# Patient Record
Sex: MALE | URBAN - METROPOLITAN AREA
[De-identification: names, ages, dates, MRNs, and addresses within clinical notes are randomized per-mention and may not be internally consistent; named-entity substitution may affect disease eponyms.]

---

## 2022-04-19 ENCOUNTER — APPOINTMENT (OUTPATIENT)
Dept: URBAN - METROPOLITAN AREA CLINIC 259 | Age: 37
Setting detail: DERMATOLOGY
End: 2022-04-26

## 2022-04-19 DIAGNOSIS — L81.4 OTHER MELANIN HYPERPIGMENTATION: ICD-10-CM

## 2022-04-19 DIAGNOSIS — D22 MELANOCYTIC NEVI: ICD-10-CM

## 2022-04-19 DIAGNOSIS — L57.0 ACTINIC KERATOSIS: ICD-10-CM

## 2022-04-19 DIAGNOSIS — D18.0 HEMANGIOMA: ICD-10-CM

## 2022-04-19 DIAGNOSIS — L57.8 OTHER SKIN CHANGES DUE TO CHRONIC EXPOSURE TO NONIONIZING RADIATION: ICD-10-CM

## 2022-04-19 PROBLEM — D22.5 MELANOCYTIC NEVI OF TRUNK: Status: ACTIVE | Noted: 2022-04-19

## 2022-04-19 PROBLEM — D48.5 NEOPLASM OF UNCERTAIN BEHAVIOR OF SKIN: Status: ACTIVE | Noted: 2022-04-19

## 2022-04-19 PROBLEM — D18.01 HEMANGIOMA OF SKIN AND SUBCUTANEOUS TISSUE: Status: ACTIVE | Noted: 2022-04-19

## 2022-04-19 PROCEDURE — 99203 OFFICE O/P NEW LOW 30 MIN: CPT | Mod: 25

## 2022-04-19 PROCEDURE — 17003 DESTRUCT PREMALG LES 2-14: CPT

## 2022-04-19 PROCEDURE — OTHER LIQUID NITROGEN: OTHER

## 2022-04-19 PROCEDURE — OTHER SHAVE REMOVAL: OTHER

## 2022-04-19 PROCEDURE — OTHER MIPS QUALITY: OTHER

## 2022-04-19 PROCEDURE — 17000 DESTRUCT PREMALG LESION: CPT | Mod: 59

## 2022-04-19 PROCEDURE — 11301 SHAVE SKIN LESION 0.6-1.0 CM: CPT

## 2022-04-19 PROCEDURE — OTHER COUNSELING: OTHER

## 2022-04-19 ASSESSMENT — LOCATION ZONE DERM
LOCATION ZONE: TRUNK
LOCATION ZONE: NECK
LOCATION ZONE: ARM

## 2022-04-19 ASSESSMENT — LOCATION DETAILED DESCRIPTION DERM
LOCATION DETAILED: SUPRAPUBIC SKIN
LOCATION DETAILED: LEFT POSTERIOR SHOULDER
LOCATION DETAILED: RIGHT POSTERIOR SHOULDER
LOCATION DETAILED: RIGHT LATERAL TRAPEZIAL NECK
LOCATION DETAILED: LEFT SUPERIOR MEDIAL UPPER BACK

## 2022-04-19 ASSESSMENT — LOCATION SIMPLE DESCRIPTION DERM
LOCATION SIMPLE: LEFT SHOULDER
LOCATION SIMPLE: RIGHT SHOULDER
LOCATION SIMPLE: GROIN
LOCATION SIMPLE: POSTERIOR NECK
LOCATION SIMPLE: LEFT UPPER BACK

## 2022-04-19 NOTE — PROCEDURE: SHAVE REMOVAL
Biopsy Method: Dermablade
Body Location Override (Optional - Billing Will Still Be Based On Selected Body Map Location If Applicable): Left suprapubic
Post-Care Instructions: I reviewed with the patient in detail post-care instructions. Patient is to keep the biopsy site dry overnight, and then apply bacitracin twice daily until healed. Patient may apply hydrogen peroxide soaks to remove any crusting.
Notification Instructions: Patient will be notified of pathology results. However, patient instructed to call the office if not contacted within 2 weeks.
Size Of Lesion In Cm (Required): 0.6
X Size Of Lesion In Cm (Optional): 0
Hemostasis: Drysol
Bill 85388 For Specimen Handling/Conveyance To Laboratory?: no
Medical Necessity Clause: This procedure was medically necessary because the lesion that was treated was:
Wound Care: Petrolatum
Anesthesia Type: 1% lidocaine with epinephrine
Detail Level: Detailed
Medical Necessity Information: It is in your best interest to select a reason for this procedure from the list below. All of these items fulfill various CMS LCD requirements except the new and changing color options.
Consent was obtained from the patient. The risks and benefits to therapy were discussed in detail. Specifically, the risks of infection, scarring, bleeding, prolonged wound healing, incomplete removal, allergy to anesthesia, nerve injury and recurrence were addressed. Prior to the procedure, the treatment site was clearly identified and confirmed by the patient. All components of Universal Protocol/PAUSE Rule completed.
Billing Type: Third-Party Bill
Was A Bandage Applied: Yes

## 2022-04-19 NOTE — PROCEDURE: LIQUID NITROGEN
Post-Care Instructions: I reviewed with the patient in detail post-care instructions. Patient is to wear sunprotection, and avoid picking at any of the treated lesions. Pt may apply Vaseline to crusted or scabbing areas.
Render Note In Bullet Format When Appropriate: No
Detail Level: Simple
Show Aperture Variable?: Yes
Duration Of Freeze Thaw-Cycle (Seconds): 5
Consent: The patient's consent was obtained including but not limited to risks of crusting, scabbing, blistering, scarring, darker or lighter pigmentary change, recurrence, incomplete removal and infection.
Number Of Freeze-Thaw Cycles: 1 freeze-thaw cycle

## 2023-10-04 ENCOUNTER — OFFICE VISIT (OUTPATIENT)
Dept: PODIATRY | Facility: CLINIC | Age: 38
End: 2023-10-04
Payer: COMMERCIAL

## 2023-10-04 VITALS — HEART RATE: 75 BPM | DIASTOLIC BLOOD PRESSURE: 70 MMHG | SYSTOLIC BLOOD PRESSURE: 125 MMHG | HEIGHT: 72 IN

## 2023-10-04 DIAGNOSIS — M21.6X1 PRONATION OF BOTH FEET: Primary | ICD-10-CM

## 2023-10-04 DIAGNOSIS — M21.6X2 PRONATION OF BOTH FEET: Primary | ICD-10-CM

## 2023-10-04 DIAGNOSIS — M20.5X9 HALLUX LIMITUS, UNSPECIFIED LATERALITY: ICD-10-CM

## 2023-10-04 PROBLEM — R91.1 SOLITARY PULMONARY NODULE: Status: ACTIVE | Noted: 2017-03-31

## 2023-10-04 PROCEDURE — 99203 OFFICE O/P NEW LOW 30 MIN: CPT | Performed by: PODIATRIST

## 2023-10-04 NOTE — PATIENT INSTRUCTIONS
We wish you continued good healing. If you have any questions or concerns, please do not hesitate to contact us at  [phone]    [facility] [employer] (secure e-mail communication and access to your chart) to send a message or to make an appointment.    Please remember to call and schedule a follow up appointment if one was recommended at your earliest convenience.     PODIATRY CLINIC HOURS  TELEPHONE NUMBER    Dr. Hernan CORNEJOPAGUSTIN FACFAS        Clinics:  [facility]   [place]  Tuesday 1PM-6PM  [place]  Wednesday 745AM-330PM  [place]  Monday 2nd,4th  830AM-4PM  Thursday/Friday 745AM-230PM     [place] APPOINTMENTS  [phone]    [place] APPOINTMENTS  [phone]          If you need a medication refill, please contact us you may need lab work and/or a follow up visit prior to your refill (i.e. Antifungal medications).  If MRI needed please call Imaging at [phone]   HOW DO I GET MY KNEE SCOOTER? Knee scooters can be picked up at ANY Medical Supply stores with your knee scooter Prescription.  OR  Bring your signed prescription to an [facility] showroom.   Set up an appointment for your custom Orthotics. Call any Orthotics locations call [phone]

## 2023-10-04 NOTE — LETTER
"    10/4/2023         RE: Juan Baldwin  [address] [place] [address]        Dear Colleague,    Thank you for referring your patient, Juan Baldwin, to the [facility]. Please see a copy of my visit note below.     Subjective:    Pt is seen today as a new pt referral with the c/c of bilateral foot pain.  Started this spring.  Aggravated by activity and relieved by rest.  Hurts patient on a daily basis.  Denies trauma.  Denies erythema ecchymosis numbness.  He is very athletic.  He works as an .    ROS:  see above         Allergies   Allergen Reactions     Sulfamethoxazole-Trimethoprim Hives       No current outpatient medications on file.       Patient Active Problem List   Diagnosis     Solitary pulmonary nodule     Astigmatism       No past medical history on file.    No past surgical history on file.    No family history on file.    Social History     Tobacco Use     Smoking status: Never     Smokeless tobacco: Never   Substance Use Topics     Alcohol use: Not on file         Exam:    Vitals: /70   Pulse 75   Ht 1.829 m (6')   BMI: There is no height or weight on file to calculate BMI.  Height: 6' 0\"    Constitutional/ general:  Pt is in no apparent distress, appears well-nourished.  Cooperative with history and physical exam.     Psych:  The patient answered questions appropriately.  Normal affect.  Seems to have reasonable expectations, in terms of treatment.     Lungs:  Non labored breathing, non labored speech. No cough.  No audible wheezing. Even, quiet breathing.       Vascular:  positive pedal pulses bilaterally for both the DP and PT arteries.  CFT < 3 sec.  negative ankle edema.  positive pedal hair growth.    Neuro:  Alert and oriented x 3. Coordinated gait.  Light touch sensation is intact     Derm: Normal texture and turgor.  No erythema, ecchymosis, or cyanosis.      Musculoskeletal:    Lower extremity muscle strength is normal.  Patient is ambulatory " without an assistive device or brace.   Pronated arch with weightbearing bilaterally.  Normal ROM all forefoot and rearfoot joints except for bilateral first MTPJ's which have limited range of motion.  Patient has pain with range of motion.  Óscar proliferation dorsally.  The right first metatarsal head is especially prominent.  No echymosis, edema, signs of infection or trauma. No open lesions, increased warmth or ascending cellulitis.      X-ray three views foot shows joint space narrowing of 1st mtpj, subcondral schlerosis, and a dorsal flag with joint mice.  Patient has elevated first metatarsal bilaterally with the right being significant    Assessment:    Bilateral pronation  Elevated first metatarsal right greater than left  Bilateral hallux limitus    Plan:  X-rays taken today.  Discussed mechanics contributing to arthritis in both first MTPJ's.  Patient has tried wearing stiff shoes and avoiding activities that bother this.  Discussed surgically he would have to have the first metatarsal elevatus addressed as well as the arthritic joints in the context of a pronated foot.  Will refer to [facility] for further consultation on this    Hernan De Oliveira DPM, FACFAS         Again, thank you for allowing me to participate in the care of your patient.        Sincerely,        Hernan De Oliveira DPM

## 2023-10-06 NOTE — PROGRESS NOTES
" Subjective:    Pt is seen today as a new pt referral with the c/c of bilateral foot pain.  Started this spring.  Aggravated by activity and relieved by rest.  Hurts patient on a daily basis.  Denies trauma.  Denies erythema ecchymosis numbness.  He is very athletic.  He works as an .    ROS:  see above         Allergies   Allergen Reactions    Sulfamethoxazole-Trimethoprim Hives       No current outpatient medications on file.       Patient Active Problem List   Diagnosis    Solitary pulmonary nodule    Astigmatism       No past medical history on file.    No past surgical history on file.    No family history on file.    Social History     Tobacco Use    Smoking status: Never    Smokeless tobacco: Never   Substance Use Topics    Alcohol use: Not on file         Exam:    Vitals: /70   Pulse 75   Ht 1.829 m (6')   BMI: There is no height or weight on file to calculate BMI.  Height: 6' 0\"    Constitutional/ general:  Pt is in no apparent distress, appears well-nourished.  Cooperative with history and physical exam.     Psych:  The patient answered questions appropriately.  Normal affect.  Seems to have reasonable expectations, in terms of treatment.     Lungs:  Non labored breathing, non labored speech. No cough.  No audible wheezing. Even, quiet breathing.       Vascular:  positive pedal pulses bilaterally for both the DP and PT arteries.  CFT < 3 sec.  negative ankle edema.  positive pedal hair growth.    Neuro:  Alert and oriented x 3. Coordinated gait.  Light touch sensation is intact     Derm: Normal texture and turgor.  No erythema, ecchymosis, or cyanosis.      Musculoskeletal:    Lower extremity muscle strength is normal.  Patient is ambulatory without an assistive device or brace.   Pronated arch with weightbearing bilaterally.  Normal ROM all forefoot and rearfoot joints except for bilateral first MTPJ's which have limited range of motion.  Patient has pain with range of motion.  Óscar " proliferation dorsally.  The right first metatarsal head is especially prominent.  No echymosis, edema, signs of infection or trauma. No open lesions, increased warmth or ascending cellulitis.      X-ray three views foot shows joint space narrowing of 1st mtpj, subcondral schlerosis, and a dorsal flag with joint mice.  Patient has elevated first metatarsal bilaterally with the right being significant    Assessment:    Bilateral pronation  Elevated first metatarsal right greater than left  Bilateral hallux limitus    Plan:  X-rays taken today.  Discussed mechanics contributing to arthritis in both first MTPJ's.  Patient has tried wearing stiff shoes and avoiding activities that bother this.  Discussed surgically he would have to have the first metatarsal elevatus addressed as well as the arthritic joints in the context of a pronated foot.  Will refer to [facility] for further consultation on this    Hernan De Oliveira DPM, FACFAS

## 2023-10-20 NOTE — TELEPHONE ENCOUNTER
Action November 9, 2023 3:56 PM MT   Action Taken Called patient for more information, patient confirms that his imagign was done at [facility], but the imaging was poorly labeled on the disk which may be the reason why [place] did not upload the imaging? Asked the patient to bring in the reports and image disk from  podiatry to appt tomorrow.       DIAGNOSIS: Bilateral Foot Arthritis   APPOINTMENT DATE: 11/10/23   NOTES STATUS DETAILS   OFFICE NOTE from referring provider Internal 10/4/23 - Hernan De Oliveira DPM - [facility]   MEDICATION LIST Internal

## 2023-11-09 DIAGNOSIS — M79.672 BILATERAL FOOT PAIN: Primary | ICD-10-CM

## 2023-11-09 DIAGNOSIS — M79.671 BILATERAL FOOT PAIN: Primary | ICD-10-CM

## 2023-11-10 ENCOUNTER — PRE VISIT (OUTPATIENT)
Dept: ORTHOPEDICS | Facility: CLINIC | Age: 38
End: 2023-11-10

## 2023-11-10 ENCOUNTER — OFFICE VISIT (OUTPATIENT)
Dept: ORTHOPEDICS | Facility: CLINIC | Age: 38
End: 2023-11-10
Attending: PODIATRIST
Payer: COMMERCIAL

## 2023-11-10 ENCOUNTER — ANCILLARY PROCEDURE (OUTPATIENT)
Dept: GENERAL RADIOLOGY | Facility: CLINIC | Age: 38
End: 2023-11-10
Attending: ORTHOPAEDIC SURGERY
Payer: COMMERCIAL

## 2023-11-10 VITALS — HEIGHT: 71 IN | BODY MASS INDEX: 27.58 KG/M2 | WEIGHT: 197 LBS

## 2023-11-10 DIAGNOSIS — M20.22 HALLUX RIGIDUS, LEFT FOOT: ICD-10-CM

## 2023-11-10 DIAGNOSIS — M79.672 BILATERAL FOOT PAIN: Primary | ICD-10-CM

## 2023-11-10 DIAGNOSIS — M79.671 BILATERAL FOOT PAIN: ICD-10-CM

## 2023-11-10 DIAGNOSIS — M79.672 BILATERAL FOOT PAIN: ICD-10-CM

## 2023-11-10 DIAGNOSIS — M79.671 BILATERAL FOOT PAIN: Primary | ICD-10-CM

## 2023-11-10 PROCEDURE — 73630 X-RAY EXAM OF FOOT: CPT | Mod: RT | Performed by: RADIOLOGY

## 2023-11-10 PROCEDURE — 99204 OFFICE O/P NEW MOD 45 MIN: CPT | Mod: GC | Performed by: ORTHOPAEDIC SURGERY

## 2023-11-10 NOTE — PROGRESS NOTES
"Orthopaedic Surgery Clinic Note - New Patient      Chief Complaint:  Bilateral hallux rigidus.    History:  Juan is a 38-year-old male who presents as a new consult for bilateral foot pain.  The patient was last seen by Dr. De Oliveira on 10/4/2023 for the same above complaint.  The patient states that his pain began over this past summer and denies any injury or trauma to his feet.  He states that he was quite active playing baseball and golf and feels that his bilateral foot pain was related to his feet rubbing against his cleats.  After he stopped playing baseball he continued to endorse pain and therefore sought evaluation.  He denies any prior injury or surgery to his bilateral feet.  He endorses pain over the first MTP joint bilaterally.  This pain is worse with walking and running.  He rates the pain as an 8/10 with running and 1/10 while at rest.  He states that the left-sided pain is worse in the right.  He has taken ibuprofen for pain control.     Past Medical History:  No past medical history on file.    Allergies:  Allergies   Allergen Reactions    Sulfamethoxazole-Trimethoprim Hives       Social History:  Social History     Occupational History    Not on file   Tobacco Use    Smoking status: Never    Smokeless tobacco: Never   Substance and Sexual Activity    Alcohol use: Not on file    Drug use: Not on file    Sexual activity: Not on file       Medications:  No current outpatient medications on file.    Exam:  Vitals: Ht 1.803 m (5' 11\")   Wt 89.4 kg (197 lb)   BMI 27.48 kg/m    Estimated body mass index is 27.48 kg/m  as calculated from the following:    Height as of this encounter: 1.803 m (5' 11\").    Weight as of this encounter: 89.4 kg (197 lb).  No acute distress  Nonlabored breathing  Focused exam of the bilateral lower extremities demonstrates skin intact.  Mild tenderness to palpation over the first MTP joint bilaterally.  Pain with first MTP flexion/extension as well as positive grind test.  " Hindfoot neutral.  5/5 strength of the tibialis anterior/gastrocsoleus/EHL/FHL.  Sensation intact light touch distally.  2+ dorsalis pedis pulse.     Imaging:  Independent review of imaging studies was performed.  These include bilateral foot AP/lateral/oblique radiographs.  These images demonstrate no acute fracture or dislocation.  Bilateral first MTPJ joint space narrowing and osteophyte formation.    Impression/Plan:  Juan is a 38-year-old male with bilateral hallux rigidus.  We discussed treatment options including both nonsurgical and surgical.  For nonsurgical management we discussed Marshall's extension orthotic, rocker-bottom shoes, topical and oral anti-inflammatories and corticosteroid injections.  For surgical management we discussed first MTPJ arthroplasty, cheilectomy, first MTPJ fusion, cotton osteotomy as well as Lapidus procedure.  We discussed the risks and benefits of each of these procedures.  We discussed at this point time we would recommend a cheilectomy procedure as this has the best chance to provide pain relief but also maintain motion.  We discussed eventually the patient may require a fusion however he is quite young.  We discussed that there are other surgeons were doing MTPJ arthroplasty and if he was interested in pursuing this would be happy to refer him to one of the surgeons.  After discussion the patient would like to proceed with cheilectomy.  We will plan to start with the left side and the right side shortly afterwards.  All questions answered.     Rodney Ortega MD  Orthopedic Surgery PGY4

## 2023-11-10 NOTE — PROGRESS NOTES
Teaching Flowsheet   Relevant Diagnosis: Bilateral Big Toes  Teaching Topic: Surgery     Person(s) involved in teaching:   Patient     Motivation Level:  Asks Questions: Yes  Eager to Learn: Yes  Cooperative: Yes  Receptive (willing/able to accept information): Yes  Any cultural factors/Spiritism beliefs that may influence understanding or compliance? No       Patient demonstrates understanding of the following:  Reason for the appointment, diagnosis and treatment plan: Yes  Knowledge of proper use of medications and conditions for which they are ordered (with special attention to potential side effects or drug interactions): Yes  Which situations necessitate calling provider and whom to contact: Yes       Teaching Concerns Addressed:        Proper use and care of medication (medical equip, care aids, etc.): Yes  Nutritional needs and diet plan: Yes  Pain management techniques: Yes  Wound Care: Yes  How and/when to access community resources: Yes     Instructional Materials Used/Given: pre op packet, soap      Time spent with patient: 15 minutes.    Luisa Roa RN

## 2023-11-10 NOTE — LETTER
11/10/2023         RE: Juan Baldwin  [address] [facility]  [place] [address]        Dear Colleague,    Thank you for referring your patient, Juan Baldwin, to the [facility] [place]. Please see a copy of my visit note below.    Teaching Flowsheet   Relevant Diagnosis: Bilateral Big Toes  Teaching Topic: Surgery     Person(s) involved in teaching:   Patient     Motivation Level:  Asks Questions: Yes  Eager to Learn: Yes  Cooperative: Yes  Receptive (willing/able to accept information): Yes  Any cultural factors/[place] beliefs that may influence understanding or compliance? No       Patient demonstrates understanding of the following:  Reason for the appointment, diagnosis and treatment plan: Yes  Knowledge of proper use of medications and conditions for which they are ordered (with special attention to potential side effects or drug interactions): Yes  Which situations necessitate calling provider and whom to contact: Yes       Teaching Concerns Addressed:        Proper use and care of medication (medical equip, care aids, etc.): Yes  Nutritional needs and diet plan: Yes  Pain management techniques: Yes  Wound Care: Yes  How and/when to access community resources: Yes     Instructional Materials Used/Given: pre op packet, soap      Time spent with patient: 15 minutes.    Luisa Roa RN      Orthopaedic Surgery Clinic Note - New Patient      Chief Complaint:  Bilateral hallux rigidus.    History:  Juan is a 38-year-old male who presents as a new consult for bilateral foot pain.  The patient was last seen by Dr. De Oliveira on 10/4/2023 for the same above complaint.  The patient states that his pain began over this past summer and denies any injury or trauma to his feet.  He states that he was quite active playing baseball and golf and feels that his bilateral foot pain was related to his feet rubbing against his cleats.  After he stopped playing baseball he continued to endorse pain  "and therefore sought evaluation.  He denies any prior injury or surgery to his bilateral feet.  He endorses pain over the first MTP joint bilaterally.  This pain is worse with walking and running.  He rates the pain as an 8/10 with running and 1/10 while at rest.  He states that the left-sided pain is worse in the right.  He has taken ibuprofen for pain control.     Past Medical History:  No past medical history on file.    Allergies:  Allergies   Allergen Reactions    Sulfamethoxazole-Trimethoprim Hives       Social History:  Social History     Occupational History    Not on file   Tobacco Use    Smoking status: Never    Smokeless tobacco: Never   Substance and Sexual Activity    Alcohol use: Not on file    Drug use: Not on file    Sexual activity: Not on file       Medications:  No current outpatient medications on file.    Exam:  Vitals: Ht 1.803 m (5' 11\")   Wt 89.4 kg (197 lb)   BMI 27.48 kg/m    Estimated body mass index is 27.48 kg/m  as calculated from the following:    Height as of this encounter: 1.803 m (5' 11\").    Weight as of this encounter: 89.4 kg (197 lb).  No acute distress  Nonlabored breathing  Focused exam of the bilateral lower extremities demonstrates skin intact.  Mild tenderness to palpation over the first MTP joint bilaterally.  Pain with first MTP flexion/extension as well as positive grind test.  Hindfoot neutral.  5/5 strength of the tibialis anterior/gastrocsoleus/EHL/FHL.  Sensation intact light touch distally.  2+ dorsalis pedis pulse.     Imaging:  Independent review of imaging studies was performed.  These include bilateral foot AP/lateral/oblique radiographs.  These images demonstrate no acute fracture or dislocation.  Bilateral first MTPJ joint space narrowing and osteophyte formation.    Impression/Plan:  Juan is a 38-year-old male with bilateral hallux rigidus.  We discussed treatment options including both nonsurgical and surgical.  For nonsurgical management we discussed " Marshall's extension orthotic, rocker-bottom shoes, topical and oral anti-inflammatories and corticosteroid injections.  For surgical management we discussed first MTPJ arthroplasty, cheilectomy, first MTPJ fusion, cotton osteotomy as well as Lapidus procedure.  We discussed the risks and benefits of each of these procedures.  We discussed at this point time we would recommend a cheilectomy procedure as this has the best chance to provide pain relief but also maintain motion.  We discussed eventually the patient may require a fusion however he is quite young.  We discussed that there are other surgeons were doing MTPJ arthroplasty and if he was interested in pursuing this would be happy to refer him to one of the surgeons.  After discussion the patient would like to proceed with cheilectomy.  We will plan to start with the left side and the right side shortly afterwards.  All questions answered.     Rodney Ortega MD  Orthopedic Surgery PGY4        Attestation signed by Wicho Sears MD at 11/21/2023  1:00 PM:  Physician Attestation   I saw this patient with the resident and agree with the resident/fellow's findings and plan of care as documented in the note.    Key findings: 37yo male patient with bilateral hallux rigidus. Endorses that his left big toe is currently more painful than his right. I reviewed his imaging studies with him. Approximately 15 degree apex plantar Meary's angle with a sag at the NC joint.  Hallux MTP arthritis bilaterally with some joint space remaining. I also reviewed both the nonsurgical and surgical treatment options with Mr. Baldwin. [place] agreement on a cheilectomy. I discussed the arthritis and likely some pain will still be there after surgery, and he may need additional future surgery such as a fusion, for which there would be drawbacks to doing this now. Risks, benefits, and alternatives were discussed. All questions answered. He'd like to do the left side first.    Wicho Sears,  MD  Date of Service (when I saw the patient): 11/10/2023.

## 2023-11-21 ENCOUNTER — TELEPHONE (OUTPATIENT)
Dept: ORTHOPEDICS | Facility: CLINIC | Age: 38
End: 2023-11-21
Payer: COMMERCIAL

## 2023-11-21 DIAGNOSIS — M20.21 HALLUX RIGIDUS OF RIGHT FOOT: Primary | ICD-10-CM

## 2023-11-21 PROBLEM — M20.22 HALLUX RIGIDUS, LEFT FOOT: Status: ACTIVE | Noted: 2023-11-10

## 2023-11-21 NOTE — TELEPHONE ENCOUNTER
[facility] Call Center    Phone Message    May a detailed message be left on voicemail: yes     Reason for Call: Other: pt would like to schedule surgery, can care team call pt to schedule that appt?      Action Taken: Other: [facility] [[id number]]    Travel Screening: Not Applicable

## 2023-11-21 NOTE — TELEPHONE ENCOUNTER
Patient is scheduled for surgery with Dr. Sears    Spoke with: Juan    Date of Surgery: 1/15/24    Location: [facility]    Informed patient they will need an adult  Yes    Pre op with Provider PCP, patient will schedule    Additional imaging/appointments: POP Made    Surgery packet: Received    Additional comments: Patient would like a call back from care team to go over restrictions, post-op care, and regarding right side surgery. Will route to team. Provided direct call back number to patient for him to call back once he decides to proceed with scheduling his right side.          Faith Blanco on 11/21/2023 at 1:54 PM

## 2023-11-21 NOTE — TELEPHONE ENCOUNTER
RN called patient to discuss surgery plan and post op appointment dates. Patient is aware and would schedule his trip after the 2 week post op and prior to 6 week follow up.    Luisa Roa RN

## 2023-11-22 NOTE — TELEPHONE ENCOUNTER
Yes, if he wants to schedule the second one that soon, ok to schedule. Although I couldn't guarantee that he'd feel ready for his second surgery four weeks after the first, it's reasonable to schedule with the thought that it's ok from my standpoint and hopefully when the time comes it'll be ok from his standpoint.    PY

## 2023-11-28 ENCOUNTER — HOSPITAL ENCOUNTER (OUTPATIENT)
Facility: AMBULATORY SURGERY CENTER | Age: 38
Discharge: HOME | End: 2023-11-28
Attending: ORTHOPAEDIC SURGERY | Admitting: ORTHOPAEDIC SURGERY
Payer: COMMERCIAL

## 2023-11-28 DIAGNOSIS — M20.21 HALLUX RIGIDUS OF RIGHT FOOT: ICD-10-CM

## 2023-11-28 NOTE — TELEPHONE ENCOUNTER
Patient is scheduled for surgery with Dr. Sears     Spoke with: Juan    Date of Surgery: 2/19/24 (R)    Location: [facility]    Informed patient they will need an adult  Yes    Pre op with Provider PCP, patient will schedule    Additional imaging/appointments: POP Made    Surgery packet: Received previously     Additional comments: N/A        Faith Blanco on 11/28/2023 at 9:17 AM

## 2023-12-21 ENCOUNTER — OFFICE VISIT (OUTPATIENT)
Dept: FAMILY MEDICINE | Facility: CLINIC | Age: 38
End: 2023-12-21
Payer: COMMERCIAL

## 2023-12-21 VITALS
BODY MASS INDEX: 26.76 KG/M2 | WEIGHT: 197.6 LBS | SYSTOLIC BLOOD PRESSURE: 115 MMHG | HEIGHT: 72 IN | DIASTOLIC BLOOD PRESSURE: 71 MMHG | HEART RATE: 72 BPM | TEMPERATURE: 97.2 F | RESPIRATION RATE: 18 BRPM | OXYGEN SATURATION: 99 %

## 2023-12-21 DIAGNOSIS — Z11.59 NEED FOR HEPATITIS C SCREENING TEST: ICD-10-CM

## 2023-12-21 DIAGNOSIS — Z11.4 SCREENING FOR HIV (HUMAN IMMUNODEFICIENCY VIRUS): ICD-10-CM

## 2023-12-21 DIAGNOSIS — Z01.818 PREOP GENERAL PHYSICAL EXAM: Primary | ICD-10-CM

## 2023-12-21 PROCEDURE — 99204 OFFICE O/P NEW MOD 45 MIN: CPT | Mod: LT | Performed by: PHYSICIAN ASSISTANT

## 2023-12-21 NOTE — PROGRESS NOTES
[address] [address]  Phone: [phone]  Primary Provider: Jose Reinoso  Pre-op Performing Provider: JOSE REINOSO      PREOPERATIVE EVALUATION:  Today's date: 12/21/2023    Juan is a 38 year old, presenting for the following:  Pre-Op Exam        12/21/2023     1:03 PM   Additional Questions   Roomed by Calvin LEW       Surgical Information:  Surgery/Procedure: Left/Right Hallux MTP joint cheilectomy  Surgery Location: [facility]   Surgeon: Dr. Sears  Surgery Date: 1/15/2023 and 2/19/2023  Time of Surgery: unsure  Where patient plans to recover: At home with family  Fax number for surgical facility: Note does not need to be faxed, will be available electronically in Epic.    Assessment & Plan     The proposed surgical procedure is considered INTERMEDIATE risk.    Problem List Items Addressed This Visit    None  Visit Diagnoses       Screening for HIV (human immunodeficiency virus)    -  Primary    Need for hepatitis C screening test                        - No identified additional risk factors other than previously addressed    Antiplatelet or Anticoagulation Medication Instructions:   - Patient is on no antiplatelet or anticoagulation medications.    Additional Medication Instructions:  Patient is on no additional chronic medications    RECOMMENDATION:  APPROVAL GIVEN to proceed with proposed procedure, without further diagnostic evaluation.      20 minutes spent by me on the date of the encounter doing chart review, history and exam, documentation and further activities per the note      Subjective       HPI related to upcoming procedure:  Left/Right Hallux MTP joint cheilectomy due to MTP arthritis bilaterally           12/21/2023     1:06 PM   Preop Questions   1. Have you ever had a heart attack or stroke? No   2. Have you ever had surgery on your heart or blood vessels, such as a stent placement, a coronary  artery bypass, or surgery on an artery in your head, neck, heart, or legs? No   3. Do you have chest pain with activity? No   4. Do you have a history of  heart failure? No   5. Do you currently have a cold, bronchitis or symptoms of other infection? No   6. Do you have a cough, shortness of breath, or wheezing? No   7. Do you or anyone in your family have previous history of blood clots? No   8. Do you or does anyone in your family have a serious bleeding problem such as prolonged bleeding following surgeries or cuts? No   9. Have you ever had problems with anemia or been told to take iron pills? No   10. Have you had any abnormal blood loss such as black, tarry or bloody stools? No   11. Have you ever had a blood transfusion? No   12. Are you willing to have a blood transfusion if it is medically needed before, during, or after your surgery? YES   13. Have you or any of your relatives ever had problems with anesthesia? No   14. Do you have sleep apnea, excessive snoring or daytime drowsiness? No   15. Do you have any artifical heart valves or other implanted medical devices like a pacemaker, defibrillator, or continuous glucose monitor? No   16. Do you have artificial joints? No   17. Are you allergic to latex? No     Health Care Directive:  Patient does not have a Health Care Directive or Living Will: Discussed advance care planning with patient; however, patient declined at this time.  FULL CODE  Preoperative Review of :   reviewed - no record of controlled substances prescribed.      Status of Chronic Conditions:  See problem list for active medical problems.  Problems all longstanding and stable, except as noted/documented.  See ROS for pertinent symptoms related to these conditions.    Review of Systems  Constitutional, neuro, ENT, endocrine, pulmonary, cardiac, gastrointestinal, genitourinary, musculoskeletal, integument and psychiatric systems are negative, except as otherwise noted.    Patient Active  "Problem List    Diagnosis Date Noted    Hallux rigidus of right foot 11/21/2023     Priority: Medium    Hallux rigidus, left foot 11/10/2023     Priority: Medium    Solitary pulmonary nodule 03/31/2017     Priority: Medium     Formatting of this note is different from the original. Indeterminate 4 x 2 mm pulmonary nodule in the lateral aspect of the right upper lobe.    Fleischner Society Recommendations for Pulmonary Nodules Nodules < 6 mm do not require routine follow-up, but certain patients at high risk with suspicious nodule morphology, upper lobe location, or both may warrant 12-month follow-up. Patient aware and will consider risk vs benefit of follow up scan March 2018.      Astigmatism 03/26/2004     Priority: Medium      History reviewed. No pertinent past medical history.  History reviewed. No pertinent surgical history.  No current outpatient medications on file.       Allergies   Allergen Reactions    Sulfamethoxazole-Trimethoprim Hives        Social History     Tobacco Use    Smoking status: Never    Smokeless tobacco: Never   Substance Use Topics    Alcohol use: Not on file     History reviewed. No pertinent family history.  History   Drug Use Not on file         Objective     /71 (BP Location: Left arm, Patient Position: Sitting, Cuff Size: Adult Large)   Pulse 72   Temp 97.2  F (36.2  C) (Tympanic)   Resp 18   Ht 1.816 m (5' 11.5\")   Wt 89.6 kg (197 lb 9.6 oz)   SpO2 99%   BMI 27.18 kg/m      Physical Exam    GENERAL APPEARANCE: healthy, alert and no distress     EYES: EOMI,  PERRL     HENT: ear canals and TM's normal and nose and mouth without ulcers or lesions     NECK: no adenopathy, no asymmetry, masses, or scars and thyroid normal to palpation     RESP: lungs clear to auscultation - no rales, rhonchi or wheezes     CV: regular rates and rhythm, normal S1 S2, no S3 or S4 and no murmur, click or rub     ABDOMEN:  soft, nontender, no HSM or masses and bowel sounds normal     MS: " "extremities normal- no gross deformities noted, no evidence of inflammation in joints, FROM in all extremities.     SKIN: no suspicious lesions or rashes     NEURO: Normal strength and tone, sensory exam grossly normal, mentation intact and speech normal     PSYCH: mentation appears normal. and affect normal/bright     LYMPHATICS: No cervical adenopathy    No results for input(s): \"HGB\", \"PLT\", \"INR\", \"NA\", \"POTASSIUM\", \"CR\", \"A1C\" in the last 51469 hours.     Diagnostics:  No labs were ordered during this visit.   No EKG required, no history of coronary heart disease, significant arrhythmia, peripheral arterial disease or other structural heart disease.    Revised Cardiac Risk Index (RCRI):  The patient has the following serious cardiovascular risks for perioperative complications:   - No serious cardiac risks = 0 points     RCRI Interpretation: 0 points: Class I (very low risk - 0.4% complication rate)         Signed Electronically by: Kathy Reinoso PA-C  Reviewed and agree with assessment and plan above. Blade Jackson MD    Copy of this evaluation report is provided to requesting physician.      "

## 2023-12-21 NOTE — PATIENT INSTRUCTIONS
Preparing for Your Surgery  Getting started  A nurse will call you to review your health history and instructions. They will give you an arrival time based on your scheduled surgery time. Please be ready to share:  Your doctor's clinic name and phone number  Your medical, surgical, and anesthesia history  A list of allergies and sensitivities  A list of medicines, including herbal treatments and over-the-counter drugs  Whether the patient has a legal guardian (ask how to send us the papers in advance)  Please tell us if you're pregnant--or if there's any chance you might be pregnant. Some surgeries may injure a fetus (unborn baby), so they require a pregnancy test. Surgeries that are safe for a fetus don't always need a test, and you can choose whether to have one.   If you have a child who's having surgery, please ask for a copy of Preparing for Your Child's Surgery.    Preparing for surgery  Within 10 to 30 days of surgery: Have a pre-op exam (sometimes called an H&P, or History and Physical). This can be done at a clinic or pre-operative center.  If you're having a , you may not need this exam. Talk to your care team.  At your pre-op exam, talk to your care team about all medicines you take. If you need to stop any medicines before surgery, ask when to start taking them again.  We do this for your safety. Many medicines can make you bleed too much during surgery. Some change how well surgery (anesthesia) drugs work.  Call your insurance company to let them know you're having surgery. (If you don't have insurance, call [phone].)  Call your clinic if there's any change in your health. This includes signs of a cold or flu (sore throat, runny nose, cough, rash, fever). It also includes a scrape or scratch near the surgery site.  If you have questions on the day of surgery, call your hospital or surgery center.  Eating and drinking guidelines  For your safety: Unless your surgeon tells you otherwise,  follow the guidelines below.  Eat and drink as usual until 8 hours before you arrive for surgery. After that, no food or milk.  Drink clear liquids until 2 hours before you arrive. These are liquids you can see through, like water, Gatorade, and Propel Water. They also include plain black coffee and tea (no cream or milk), candy, and breath mints. You can spit out gum when you arrive.  If you drink alcohol: Stop drinking it the night before surgery.  If your care team tells you to take medicine on the morning of surgery, it's okay to take it with a sip of water.  Preventing infection  Shower or bathe the night before and morning of your surgery. Follow the instructions your clinic gave you. (If no instructions, use regular soap.)  Don't shave or clip hair near your surgery site. We'll remove the hair if needed.  Don't smoke or vape the morning of surgery. You may chew nicotine gum up to 2 hours before surgery. A nicotine patch is okay.  Note: Some surgeries require you to completely quit smoking and nicotine. Check with your surgeon.  Your care team will make every effort to keep you safe from infection. We will:  Clean our hands often with soap and water (or an alcohol-based hand rub).  Clean the skin at your surgery site with a special soap that kills germs.  Give you a special gown to keep you warm. (Cold raises the risk of infection.)  Wear special hair covers, masks, gowns and gloves during surgery.  Give antibiotic medicine, if prescribed. Not all surgeries need antibiotics.  What to bring on the day of surgery  Photo ID and insurance card  Copy of your health care directive, if you have one  Glasses and hearing aids (bring cases)  You can't wear contacts during surgery  Inhaler and eye drops, if you use them (tell us about these when you arrive)  CPAP machine or breathing device, if you use them  A few personal items, if spending the night  If you have . . .  A pacemaker, ICD (cardiac defibrillator) or other  implant: Bring the ID card.  An implanted stimulator: Bring the remote control.  A legal guardian: Bring a copy of the certified (court-stamped) guardianship papers.  Please remove any jewelry, including body piercings. Leave jewelry and other valuables at home.  If you're going home the day of surgery  You must have a responsible adult drive you home. They should stay with you overnight as well.  If you don't have someone to stay with you, and you aren't safe to go home alone, we may keep you overnight. Insurance often won't pay for this.  After surgery  If it's hard to control your pain or you need more pain medicine, please call your surgeon's office.  Questions?   If you have any questions for your care team, list them here: _________________________________________________________________________________________________________________________________________________________________________ ____________________________________ ____________________________________ ____________________________________  For informational purposes only. Not to replace the advice of your health care provider. Copyright   2003, 2019 [employer] [facility]. All rights reserved. Clinically reviewed by Lamar Malik MD. SMART[employer] 181178 - REV 12/22.    How to Take Your Medication Before Surgery  - no daily medications

## 2023-12-22 NOTE — PROGRESS NOTES
Fax number for surgical facility: Note does not need to be faxed, will be available electronically in Epic.   This has been co signed by Dr Jackson.  Nancy Noyola [facility]   Primary Care

## 2024-01-12 ENCOUNTER — ANESTHESIA EVENT (OUTPATIENT)
Dept: SURGERY | Facility: AMBULATORY SURGERY CENTER | Age: 39
End: 2024-01-12
Payer: COMMERCIAL

## 2024-01-15 ENCOUNTER — HOSPITAL ENCOUNTER (OUTPATIENT)
Facility: AMBULATORY SURGERY CENTER | Age: 39
Discharge: HOME | End: 2024-01-15
Attending: ORTHOPAEDIC SURGERY | Admitting: ORTHOPAEDIC SURGERY
Payer: COMMERCIAL

## 2024-01-15 ENCOUNTER — ANCILLARY PROCEDURE (OUTPATIENT)
Dept: RADIOLOGY | Facility: AMBULATORY SURGERY CENTER | Age: 39
End: 2024-01-15
Attending: ORTHOPAEDIC SURGERY
Payer: COMMERCIAL

## 2024-01-15 ENCOUNTER — ANESTHESIA (OUTPATIENT)
Dept: SURGERY | Facility: AMBULATORY SURGERY CENTER | Age: 39
End: 2024-01-15
Payer: COMMERCIAL

## 2024-01-15 ENCOUNTER — TELEPHONE (OUTPATIENT)
Dept: ORTHOPEDICS | Facility: CLINIC | Age: 39
End: 2024-01-15

## 2024-01-15 VITALS
SYSTOLIC BLOOD PRESSURE: 97 MMHG | DIASTOLIC BLOOD PRESSURE: 59 MMHG | BODY MASS INDEX: 25.73 KG/M2 | TEMPERATURE: 97 F | RESPIRATION RATE: 12 BRPM | HEART RATE: 51 BPM | OXYGEN SATURATION: 99 % | WEIGHT: 190 LBS | HEIGHT: 72 IN

## 2024-01-15 DIAGNOSIS — Z98.890 STATUS POST FOOT SURGERY: Primary | ICD-10-CM

## 2024-01-15 DIAGNOSIS — M20.21 HALLUX RIGIDUS OF RIGHT FOOT: Primary | ICD-10-CM

## 2024-01-15 DIAGNOSIS — M20.22 HALLUX RIGIDUS, LEFT FOOT: ICD-10-CM

## 2024-01-15 PROCEDURE — 28289 CORRJ HALUX RIGDUS W/O IMPLT: CPT | Mod: TA | Performed by: ORTHOPAEDIC SURGERY

## 2024-01-15 PROCEDURE — 28289 CORRJ HALUX RIGDUS W/O IMPLT: CPT | Mod: TA

## 2024-01-15 PROCEDURE — C9290 INJ, BUPIVACAINE LIPOSOME: HCPCS

## 2024-01-15 DEVICE — IMP WIRE KIRSCHNER 1.4MM 0.054X4" SGL END TROCAR KM71-133: Type: IMPLANTABLE DEVICE | Site: FOOT | Status: FUNCTIONAL

## 2024-01-15 RX ORDER — FENTANYL CITRATE 50 UG/ML
INJECTION, SOLUTION INTRAMUSCULAR; INTRAVENOUS PRN
Status: DISCONTINUED | OUTPATIENT
Start: 2024-01-15 | End: 2024-01-15

## 2024-01-15 RX ORDER — ACETAMINOPHEN 500 MG
1000 TABLET ORAL EVERY 8 HOURS PRN
Qty: 60 TABLET | Refills: 0 | Status: SHIPPED | OUTPATIENT
Start: 2024-01-15 | End: 2024-08-13

## 2024-01-15 RX ORDER — EPHEDRINE SULFATE 50 MG/ML
INJECTION, SOLUTION INTRAMUSCULAR; INTRAVENOUS; SUBCUTANEOUS PRN
Status: DISCONTINUED | OUTPATIENT
Start: 2024-01-15 | End: 2024-01-15

## 2024-01-15 RX ORDER — GLYCOPYRROLATE 0.2 MG/ML
INJECTION, SOLUTION INTRAMUSCULAR; INTRAVENOUS PRN
Status: DISCONTINUED | OUTPATIENT
Start: 2024-01-15 | End: 2024-01-15

## 2024-01-15 RX ORDER — HYDROMORPHONE HYDROCHLORIDE 1 MG/ML
0.4 INJECTION, SOLUTION INTRAMUSCULAR; INTRAVENOUS; SUBCUTANEOUS EVERY 5 MIN PRN
Status: DISCONTINUED | OUTPATIENT
Start: 2024-01-15 | End: 2024-01-16 | Stop reason: HOSPADM

## 2024-01-15 RX ORDER — ONDANSETRON 2 MG/ML
INJECTION INTRAMUSCULAR; INTRAVENOUS PRN
Status: DISCONTINUED | OUTPATIENT
Start: 2024-01-15 | End: 2024-01-15

## 2024-01-15 RX ORDER — ONDANSETRON 2 MG/ML
4 INJECTION INTRAMUSCULAR; INTRAVENOUS EVERY 30 MIN PRN
Status: DISCONTINUED | OUTPATIENT
Start: 2024-01-15 | End: 2024-01-16 | Stop reason: HOSPADM

## 2024-01-15 RX ORDER — IBUPROFEN 600 MG/1
600 TABLET, FILM COATED ORAL EVERY 6 HOURS PRN
Qty: 40 TABLET | Refills: 0 | Status: SHIPPED | OUTPATIENT
Start: 2024-01-15 | End: 2024-08-13

## 2024-01-15 RX ORDER — ASPIRIN 81 MG/1
162 TABLET ORAL DAILY
Qty: 84 TABLET | Refills: 0 | Status: SHIPPED | OUTPATIENT
Start: 2024-01-16 | End: 2024-02-27

## 2024-01-15 RX ORDER — FLUMAZENIL 0.1 MG/ML
0.2 INJECTION, SOLUTION INTRAVENOUS
Status: DISCONTINUED | OUTPATIENT
Start: 2024-01-15 | End: 2024-01-16 | Stop reason: HOSPADM

## 2024-01-15 RX ORDER — ONDANSETRON 4 MG/1
4 TABLET, ORALLY DISINTEGRATING ORAL EVERY 30 MIN PRN
Status: DISCONTINUED | OUTPATIENT
Start: 2024-01-15 | End: 2024-01-16 | Stop reason: HOSPADM

## 2024-01-15 RX ORDER — NALOXONE HYDROCHLORIDE 0.4 MG/ML
0.4 INJECTION, SOLUTION INTRAMUSCULAR; INTRAVENOUS; SUBCUTANEOUS
Status: DISCONTINUED | OUTPATIENT
Start: 2024-01-15 | End: 2024-01-16 | Stop reason: HOSPADM

## 2024-01-15 RX ORDER — NALOXONE HYDROCHLORIDE 0.4 MG/ML
0.2 INJECTION, SOLUTION INTRAMUSCULAR; INTRAVENOUS; SUBCUTANEOUS
Status: DISCONTINUED | OUTPATIENT
Start: 2024-01-15 | End: 2024-01-16 | Stop reason: HOSPADM

## 2024-01-15 RX ORDER — SODIUM CHLORIDE, SODIUM LACTATE, POTASSIUM CHLORIDE, CALCIUM CHLORIDE 600; 310; 30; 20 MG/100ML; MG/100ML; MG/100ML; MG/100ML
INJECTION, SOLUTION INTRAVENOUS CONTINUOUS
Status: DISCONTINUED | OUTPATIENT
Start: 2024-01-15 | End: 2024-01-16 | Stop reason: HOSPADM

## 2024-01-15 RX ORDER — OXYCODONE HYDROCHLORIDE 5 MG/1
5 TABLET ORAL
Status: DISCONTINUED | OUTPATIENT
Start: 2024-01-15 | End: 2024-01-16 | Stop reason: HOSPADM

## 2024-01-15 RX ORDER — FENTANYL CITRATE 50 UG/ML
50 INJECTION, SOLUTION INTRAMUSCULAR; INTRAVENOUS EVERY 5 MIN PRN
Status: DISCONTINUED | OUTPATIENT
Start: 2024-01-15 | End: 2024-01-16 | Stop reason: HOSPADM

## 2024-01-15 RX ORDER — KETOROLAC TROMETHAMINE 30 MG/ML
INJECTION, SOLUTION INTRAMUSCULAR; INTRAVENOUS PRN
Status: DISCONTINUED | OUTPATIENT
Start: 2024-01-15 | End: 2024-01-15

## 2024-01-15 RX ORDER — CEFAZOLIN SODIUM/WATER 2 G/20 ML
SYRINGE (ML) INTRAVENOUS PRN
Status: DISCONTINUED | OUTPATIENT
Start: 2024-01-15 | End: 2024-01-15

## 2024-01-15 RX ORDER — GABAPENTIN 300 MG/1
300 CAPSULE ORAL
Status: COMPLETED | OUTPATIENT
Start: 2024-01-15 | End: 2024-01-15

## 2024-01-15 RX ORDER — FENTANYL CITRATE 50 UG/ML
25 INJECTION, SOLUTION INTRAMUSCULAR; INTRAVENOUS EVERY 5 MIN PRN
Status: DISCONTINUED | OUTPATIENT
Start: 2024-01-15 | End: 2024-01-16 | Stop reason: HOSPADM

## 2024-01-15 RX ORDER — FENTANYL CITRATE 50 UG/ML
25-50 INJECTION, SOLUTION INTRAMUSCULAR; INTRAVENOUS
Status: DISCONTINUED | OUTPATIENT
Start: 2024-01-15 | End: 2024-01-16 | Stop reason: HOSPADM

## 2024-01-15 RX ORDER — PROPOFOL 10 MG/ML
INJECTION, EMULSION INTRAVENOUS PRN
Status: DISCONTINUED | OUTPATIENT
Start: 2024-01-15 | End: 2024-01-15

## 2024-01-15 RX ORDER — LIDOCAINE HYDROCHLORIDE 20 MG/ML
INJECTION, SOLUTION INFILTRATION; PERINEURAL PRN
Status: DISCONTINUED | OUTPATIENT
Start: 2024-01-15 | End: 2024-01-15

## 2024-01-15 RX ORDER — OXYCODONE HYDROCHLORIDE 5 MG/1
5 TABLET ORAL EVERY 6 HOURS PRN
Qty: 12 TABLET | Refills: 0 | Status: SHIPPED | OUTPATIENT
Start: 2024-01-15 | End: 2024-01-18

## 2024-01-15 RX ORDER — OXYCODONE HYDROCHLORIDE 5 MG/1
10 TABLET ORAL
Status: DISCONTINUED | OUTPATIENT
Start: 2024-01-15 | End: 2024-01-16 | Stop reason: HOSPADM

## 2024-01-15 RX ORDER — HYDROMORPHONE HYDROCHLORIDE 1 MG/ML
0.2 INJECTION, SOLUTION INTRAMUSCULAR; INTRAVENOUS; SUBCUTANEOUS EVERY 5 MIN PRN
Status: DISCONTINUED | OUTPATIENT
Start: 2024-01-15 | End: 2024-01-16 | Stop reason: HOSPADM

## 2024-01-15 RX ORDER — BUPIVACAINE HYDROCHLORIDE 2.5 MG/ML
INJECTION, SOLUTION EPIDURAL; INFILTRATION; INTRACAUDAL
Status: COMPLETED | OUTPATIENT
Start: 2024-01-15 | End: 2024-01-15

## 2024-01-15 RX ORDER — ACETAMINOPHEN 325 MG/1
975 TABLET ORAL ONCE
Status: COMPLETED | OUTPATIENT
Start: 2024-01-15 | End: 2024-01-15

## 2024-01-15 RX ORDER — PROPOFOL 10 MG/ML
INJECTION, EMULSION INTRAVENOUS CONTINUOUS PRN
Status: DISCONTINUED | OUTPATIENT
Start: 2024-01-15 | End: 2024-01-15

## 2024-01-15 RX ORDER — LIDOCAINE 40 MG/G
CREAM TOPICAL
Status: DISCONTINUED | OUTPATIENT
Start: 2024-01-15 | End: 2024-01-16 | Stop reason: HOSPADM

## 2024-01-15 RX ADMIN — EPHEDRINE SULFATE 5 MG: 50 INJECTION, SOLUTION INTRAMUSCULAR; INTRAVENOUS; SUBCUTANEOUS at 15:29

## 2024-01-15 RX ADMIN — ACETAMINOPHEN 975 MG: 325 TABLET ORAL at 12:53

## 2024-01-15 RX ADMIN — GLYCOPYRROLATE 0.2 MG: 0.2 INJECTION, SOLUTION INTRAMUSCULAR; INTRAVENOUS at 15:01

## 2024-01-15 RX ADMIN — EPHEDRINE SULFATE 10 MG: 50 INJECTION, SOLUTION INTRAMUSCULAR; INTRAVENOUS; SUBCUTANEOUS at 15:11

## 2024-01-15 RX ADMIN — BUPIVACAINE HYDROCHLORIDE 10 ML: 2.5 INJECTION, SOLUTION EPIDURAL; INFILTRATION; INTRACAUDAL at 13:35

## 2024-01-15 RX ADMIN — Medication 2 G: at 15:12

## 2024-01-15 RX ADMIN — EPHEDRINE SULFATE 5 MG: 50 INJECTION, SOLUTION INTRAMUSCULAR; INTRAVENOUS; SUBCUTANEOUS at 15:52

## 2024-01-15 RX ADMIN — SODIUM CHLORIDE, SODIUM LACTATE, POTASSIUM CHLORIDE, CALCIUM CHLORIDE: 600; 310; 30; 20 INJECTION, SOLUTION INTRAVENOUS at 13:19

## 2024-01-15 RX ADMIN — PROPOFOL 200 MG: 10 INJECTION, EMULSION INTRAVENOUS at 15:01

## 2024-01-15 RX ADMIN — FENTANYL CITRATE 100 MCG: 50 INJECTION, SOLUTION INTRAMUSCULAR; INTRAVENOUS at 15:01

## 2024-01-15 RX ADMIN — EPHEDRINE SULFATE 5 MG: 50 INJECTION, SOLUTION INTRAMUSCULAR; INTRAVENOUS; SUBCUTANEOUS at 15:54

## 2024-01-15 RX ADMIN — GABAPENTIN 300 MG: 300 CAPSULE ORAL at 12:53

## 2024-01-15 RX ADMIN — FENTANYL CITRATE 50 MCG: 50 INJECTION, SOLUTION INTRAMUSCULAR; INTRAVENOUS at 13:39

## 2024-01-15 RX ADMIN — LIDOCAINE HYDROCHLORIDE 100 MG: 20 INJECTION, SOLUTION INFILTRATION; PERINEURAL at 15:01

## 2024-01-15 RX ADMIN — KETOROLAC TROMETHAMINE 30 MG: 30 INJECTION, SOLUTION INTRAMUSCULAR; INTRAVENOUS at 15:58

## 2024-01-15 RX ADMIN — ONDANSETRON 4 MG: 2 INJECTION INTRAMUSCULAR; INTRAVENOUS at 15:23

## 2024-01-15 RX ADMIN — PROPOFOL 150 MCG/KG/MIN: 10 INJECTION, EMULSION INTRAVENOUS at 15:01

## 2024-01-15 NOTE — OP NOTE
DATE OF SURGERY:  01/15/2024.     PREOPERATIVE DIAGNOSIS:  Left hallux rigidus.    POSTOPERATIVE DIAGNOSIS:  Left hallux rigidus.    PROCEDURE:  Left hallux metatarsophalangeal joint cheilectomy.     PRIMARY SURGEON:  Wicho Sears MD.    FIRST ASSISTANT:  Jane Hooker MD.     ANESTHESIA:  General supraglottic with left sciatic block.     COMPLICATIONS:  None apparent intraoperatively or immediately postoperatively.     IMPLANTS:  None.     SIGNIFICANT FINDINGS:  7 x 13 mm full thickness chondral defect at the dorsal aspect of the metatarsal head articular surface.  This was drilled with a 0.054 inch K-wire for marrow stimulation.     SPECIMENS:  None.     DRAINS:  None.     ESTIMATED BLOOD LOSS:  10 mL.    TOURNIQUET TIME:  31 minutes at 250 mm Hg.       INDICATIONS:  Juan Baldwin is a very pleasant 38-year-old patient with arthritis of the left hallux metatarsophalangeal joint but adequate appearing joint space remaining on radiographs.   Nonsurgical and surgical treatment options were offered and discussed thoroughly.  He has tried and failed to obtain adequate long lasting relief from conservative treatment.  He wished to proceed with surgery and the various options were discussed.  [place] agreement was had on a left hallux metatarsophalangeal joint cheilectomy.  The advantages of this over an arthrodesis in his case were discussed, though we did discuss that the arthritis would still be there afterwards as well as any part of his current pain that was coming from the arthritis rather than the osteophytes, and that the spurs would likely eventual return with time.  I discussed he could still need future surgery to treat the arthritis as time went on.  The nature of the planned procedure, the risks, benefits, and alternatives, the postoperative plan, and realistic expectations for short and long term outcome were all discussed in layman's terms.  No guarantees were expressed or implied as to outcome.  The patient  had the opportunity to have all the questions he had at the time asked and answered appropriately, verbalized his understanding of this discussion, and verbalized his wish to proceed with the planned procedure.  Written informed consent was obtained.     DESCRIPTION OF PROCEDURE:             The patient, Juan Baldwin, was met in the preoperative area where the correct surgical site was identified with patient participation and marked by the primary surgeon.  All questions were answered. The anesthesia team performed a left sciatic nerve block.  The patient was then brought to the operating room, where he was carefully transferred to the operating room table in the supine position with all bony prominences well padded and a safety strap across the waist.  A soft bump was placed underneath the left hip to keep the toes pointing upwards.  The anesthesiology team successfully induced general anesthesia and placed a supraglottic airway.  Ancef was given IV per protocol.  Venous thromboembolic prophylaxis was performed with a sequential device on the nonoperative leg.   A tourniquet was placed proximal to the surgical site on the left thigh.  The patient's left lower extremity was then prepped and draped free in the usual sterile fashion.  All five toes, with the hallux separate, were kept covered with sterile gloves for the entire case.  Prior to proceeding with the operation a timeout was held per hospital policy correctly identifying the patient, procedure to be performed, and operative site including laterality.  All in the room agreed.     The left lower extremity was exsanguinated with a Shahid bandage, and the tourniquet was inflated to 250mm Hg.  A longitudinal incision was made in the dorsal midline of the left hallux MTP joint through skin only with a #15 blade.  Careful dissection was performed through the subcutaneous tissues.  Meticulous hemostasis was achieved.  The EHL tendon was gently retracted laterally  with a blunt retractor to protect it.  The subjacent MTP joint capsule was incised longitudinally and retracted.  The MTP joint was thoroughly inspected.  There were large osteophytes on the dorsal metatarsal head, moderate on the lateral side, and small on the medial side.  There was a 7 mm x 13 mm focal area of full thickness chondral loss oriented transversely on the dorsal aspect of the metatarsal head articular surface, but the remainder of the cartilage appeared intact.  The MTP joint was subperiosteally elevated circumferentially to allow delivery of the dorsal metatarsal head  through the incision.  Care was taken to avoid injury to the neurovascular bundles.  Blunt retractors were placed directly on bone on the lateral metatarsal head.  A rongeur was used to resect moderately sized lateral metatarsal head osteophytes to a smooth base until the area was confluent with surrounding bone.  Bone wax was used to cover the bony surface where the bone was resected.  The retractors were removed and reinserted directly on bone on the medial head where there was a very small osteophyte visible which was also resected followed by bone wax application to the resection surface.  One retractor was then inserted on either side of the metatarsal head medial and lateral, the MTP joint was gently plantarflexed, and the EHL tendon was gently retracted laterally.   A saw was used to resect the large dorsal metatarsal head osteophytes and the dorsal rim of the metatarsal head to a level confluent with the dorsal distal metatarsal shaft.  The sharp edges were smoothed with a rongeur.  The dorsal bone resection surface was covered with bone wax.  Final c-arm views confirmed appropriate bony resection.  The toe was maximally dorsiflexed to approximately 45 degrees and plantarflexed to approximately 30 degrees.  Alignment in the sagittal and coronal planes appeared appropriate and unchanged compared with preop.  The tourniquet was  deflated and meticulous hemostasis was achieved.  The chondral lesion was drilled with a 0.054 inch K-wire for marrow stimulation with healthy bleeding of subchondral bone. There was no evidence for vascular injury.  The toe appeared to be well perfused.  The wound was closed in layers including 0-Vicryl for the joint capsule, 2-0 Vicryl for the subcutaneous tissues, and 3-0 nylon for the skin.  The wound was cleansed off and dried, and the foot was then dressed sterilely.  A postop shoe was applied.  All surgical counts were reported as correct at the end of the case. The patient was taken to the recovery room in stable condition.  I was present and scrubbed in for the entire case from start to finish.       POSTOPERATIVE PLAN:    Discharge home today once criteria met.  Pain control:  Multimodal.  Wean off postop opioid medication.  Diet:  Start with clears and advance to regular as tolerated.  Weightbearing status:  Nonweightbearing, ice, and elevate the left foot for 2 weeks.  May bear weight as tolerated on the left foot in the postop shoe for brief and short periods for activities of daily living only.  Activity:  Encourage rest, ice, and elevation of the left foot as much as possible for the first 2 weeks postop.  However, patient should also ambulate for brief periods hourly while awake for circulation.  Drains:  None.  Xrays:  Completed intraoperatively.  Immobilization:  Keep left postop shoe clean, dry, and intact.  Dressings:  Leave dressings clean, dry, and intact until changed in clinic.  DVT prophylaxis:   mg po daily until 6 weeks postop.  Follow up:  First scheduled visit in 2 weeks in ortho clinic for dressing change, wound check, and possible suture removal if appropriate to do so at that time.  May then start to bear weight as tolerated on the left foot with the postop shoe and start self-directed range of motion exercises in dorsi- and plantarflexion to the left hallux as tolerated.  Next  normally scheduled appointment after that will be 6 weeks postop with weightbearing xrays of the operative foot.  It is anticipated to transition to a regular shoe at that time.       Wciho Sears MD  Attending surgeon  Orthopaedic Surgery

## 2024-01-15 NOTE — TELEPHONE ENCOUNTER
ATC called patient to notify of two things: 1. We placed order for a knee scooter for this patient.  [facility] provided phone number for the DME location at [facility] for patient to call and set up an appointment to obtain knee scooter.  2.  ATC scheduled patient for a nurse visit for 2 week post op wound check/suture removal on 1/29/24 at 9am.  ATC requested patient call us or MyChart message our staff if that date/time does not work for them.      Omid Coronado MS, ATC, LAT, [facility]  Dr. Preet Sears

## 2024-01-15 NOTE — PROGRESS NOTES
Orthopaedic Surgery Attending    I saw and examined this pleasant 38 year old patient preoperatively today in the preop area at [facility]. The surgical plan for left hallux MTP joint cheilectomy, including what the surgery involves, the risks, benefits, and alternatives, the postoperative plan, and realistic expectations for outcome, were all discussed in layman's terms. No guarantees were expressed or implied as to outcome. The correct surgical site was marked with patient participation. All questions were answered.

## 2024-01-15 NOTE — ANESTHESIA PROCEDURE NOTES
Sciatic Procedure Note    Pre-Procedure   Staff -        Anesthesiologist:  Mitul Will MD       Resident/Fellow: Omid Gandara       Performed By: resident       Location: pre-op       Procedure Start/Stop Times: 1/15/2024 1:35 PM and 1/15/2024 1:45 PM       Pre-Anesthestic Checklist: patient identified, IV checked, site marked, risks and benefits discussed, informed consent, monitors and equipment checked, pre-op evaluation, at physician/surgeon's request and post-op pain management  Timeout:       Correct Patient: Yes        Correct Procedure: Yes        Correct Site: Yes        Correct Position: Yes        Correct Laterality: Yes        Site Marked: Yes  Procedure Documentation  Procedure: Sciatic       Laterality: left       Patient Position: supine       Patient Prep/Sterile Barriers: sterile gloves, mask       Skin prep: Chloraprep (popliteal approach).       Needle Type: short bevel       Needle Gauge: 21.        Needle Length (millimeters): 100        Ultrasound guided       1. Ultrasound was used to identify targeted nerve, plexus, vascular marker, or fascial plane and place a needle adjacent to it in real-time.       2. Ultrasound was used to visualize the spread of anesthetic in close proximity to the above referenced structure.       3. A permanent image is entered into the patient's record.    Assessment/Narrative         The placement was negative for: blood aspirated, painful injection and site bleeding       Paresthesias: No.       Bolus given via needle. no blood aspirated via catheter.        Secured via.        Insertion/Infusion Method: Single Shot       Complications: none    Medication(s) Administered   Bupivacaine 0.25% PF (Infiltration) - Infiltration   10 mL - 1/15/2024 1:35:00 PM  Bupivacaine liposome (Exparel) 1.3% LA inj susp (Infiltration) - Infiltration   10 mL - 1/15/2024 1:35:00 PM  Medication Administration Time: 1/15/2024 1:35 PM      FOR [facility] ([facility]) ONLY:   " Pain Team Contact information: please page the Pain Team Via [facility]. Search \"Pain\". During daytime hours, please page the attending first. At night please page the resident first.      "

## 2024-01-15 NOTE — ANESTHESIA PREPROCEDURE EVALUATION
"Anesthesia Pre-Procedure Evaluation    Patient: Juan Baldwin   MRN: [id number] : 1985        Procedure : Procedure(s):  Left hallux MTP joint cheilectomy          No past medical history on file.   No past surgical history on file.   Allergies   Allergen Reactions    Sulfamethoxazole-Trimethoprim Hives      Social History     Tobacco Use    Smoking status: Never    Smokeless tobacco: Never   Substance Use Topics    Alcohol use: Not on file      Wt Readings from Last 1 Encounters:   01/15/24 86.2 kg (190 lb)        Anesthesia Evaluation   Pt has had prior anesthetic.     No history of anesthetic complications       ROS/MED HX  ENT/Pulmonary: Comment: Solitary lung nodule      Neurologic:  - neg neurologic ROS     Cardiovascular:  - neg cardiovascular ROS     METS/Exercise Tolerance:     Hematologic:  - neg hematologic  ROS     Musculoskeletal:   (+)  arthritis,             GI/Hepatic:  - neg GI/hepatic ROS     Renal/Genitourinary:  - neg Renal ROS     Endo:  - neg endo ROS     Psychiatric/Substance Use:  - neg psychiatric ROS     Infectious Disease:  - neg infectious disease ROS     Malignancy:  - neg malignancy ROS     Other:            Physical Exam    Airway        Mallampati: I   TM distance: > 3 FB   Neck ROM: full   Mouth opening: > 3 cm    Respiratory Devices and Support         Dental       (+) Minor Abnormalities - some fillings, tiny chips      Cardiovascular   cardiovascular exam normal          Pulmonary   pulmonary exam normal                OUTSIDE LABS:  CBC: No results found for: \"WBC\", \"HGB\", \"HCT\", \"PLT\"  BMP: No results found for: \"NA\", \"POTASSIUM\", \"CHLORIDE\", \"CO2\", \"BUN\", \"CR\", \"GLC\"  COAGS: No results found for: \"PTT\", \"INR\", \"FIBR\"  POC: No results found for: \"BGM\", \"HCG\", \"HCGS\"  HEPATIC: No results found for: \"ALBUMIN\", \"PROTTOTAL\", \"ALT\", \"AST\", \"GGT\", \"ALKPHOS\", \"BILITOTAL\", \"BILIDIRECT\", \"ODALYS\"  OTHER: No results found for: \"PH\", \"LACT\", \"A1C\", \"MIKEY\", \"PHOS\", \"MAG\", \"LIPASE\", " "\"AMYLASE\", \"TSH\", \"T4\", \"T3\", \"CRP\", \"SED\"    Anesthesia Plan    ASA Status:  1    NPO Status:  NPO Appropriate    Anesthesia Type: General.     - Airway: LMA   Induction: Intravenous, Propofol.   Maintenance: TIVA.        Consents    Anesthesia Plan(s) and associated risks, benefits, and realistic alternatives discussed. Questions answered and patient/representative(s) expressed understanding.     - Discussed: Risks, Benefits and Alternatives for BOTH SEDATION and the PROCEDURE were discussed     - Discussed with:  Patient      - Extended Intubation/Ventilatory Support Discussed: No.      - Patient is DNR/DNI Status: No     Use of blood products discussed: No .     Postoperative Care    Pain management: IV analgesics, Oral pain medications, Peripheral nerve block (Single Shot).   PONV prophylaxis: Ondansetron (or other 5HT-3), Dexamethasone or Solumedrol, Background Propofol Infusion     Comments:               Omid Gandara    I have reviewed the pertinent notes and labs in the chart from the past 30 days and (re)examined the patient.  Any updates or changes from those notes are reflected in this note.              # Overweight: Estimated body mass index is 25.77 kg/m  as calculated from the following:    Height as of this encounter: 1.829 m (6').    Weight as of this encounter: 86.2 kg (190 lb).      "

## 2024-01-15 NOTE — PROGRESS NOTES
I spoke with the patient's spouse Jacey immediately postoperative in the family waiting area. Findings and plan discussed. All questions answered.

## 2024-01-15 NOTE — PROGRESS NOTES
Patient received left side regional left sciatic nerve block  with Exparel.  Fentanyl 50mcg and Versed 2mg given. Tolerated procedure well.

## 2024-01-15 NOTE — ANESTHESIA PROCEDURE NOTES
Airway       Patient location during procedure: OR  Staff -        CRNA: Delilah Wellington APRN CRNA       Performed By: CRNAIndications and Patient Condition       Indications for airway management: consuelo-procedural       Induction type:intravenous       Mask difficulty assessment: 1 - vent by mask    Final Airway Details       Final airway type: supraglottic airway    Supraglottic Airway Details        Type: LMA       Brand: LMA Unique       LMA size: 5    Post intubation assessment        Placement verified by: capnometry and equal breath sounds        Number of attempts at approach: 1       Secured with: tape       Ease of procedure: easy       Dentition: Intact and Unchanged

## 2024-01-15 NOTE — BRIEF OP NOTE
"   [facility]    Brief Operative Note    Pre-operative diagnosis: Hallux rigidus, left foot [M20.22]  Post-operative diagnosis Same as pre-operative diagnosis    Procedure: Left hallux MTP joint cheilectomy, Left - Foot    Surgeon: Surgeon(s) and Role:     * Wicho Sears MD - Primary     * Jane Hooker MD - Resident - Assisting  Anesthesia: General with Block   Estimated Blood Loss: 10 ml    Drains: None  Specimens: * No specimens in log *  Findings:   Please see full op report .  Complications: None.  Implants:   Implant Name Type Inv. Item Serial No.  Lot No. LRB No. Used Action   IMP WIRE VARGAS 1.4MM 0.054X4\" [facility] [place] [id number] - [id number]  IMP WIRE VARGAS 1.4MM 0.054X4\" [facility] [place] [id number]  [employer]  Left 1 Used as a Supply         Orthopedic Postoperative Plan:  - Activity: Up ad mark with postop shoe. WBAT for ADLs only in postop shoe  - Weight bearing status: WBAT for ADLs only  - Antibiotics completed intra-op   - DVT ppx:  Aspirin 162 x 6 weeks  - Pain control:  adductor canal block in preop. PRN ibuprofen and acetaminophen with 12 tabs oxycodone for breakthrough  - Dressing: Keep c/d/I until clinic. Wear boot when up. Okay to remove in bed.  - Follow up: 2 weeks for wound check, suture removal.  - Dispo: Discharge to home per PACU standard.    Jane Hooker, PGY-4     "
0

## 2024-01-15 NOTE — DISCHARGE INSTRUCTIONS
"[facility]  Home Care Following Anesthesia  For 24 hours after surgery:  Get plenty of rest.  A responsible adult must stay with you for at least 24 hours after you leave the surgery center.  Do not drive or use heavy equipment.  If you have weakness or tingling, don't drive or use heavy equipment until this feeling goes away.   Do not drink alcohol.   Avoid strenuous or risky activities.  Ask for help when climbing stairs.  You may feel lightheaded.  IF so, sit for a few minutes before standing.  Have someone help you get up.   If you have nausea (feel sick to your stomach): Drink only clear liquids such as apple juice, ginger ale, broth or 7-Up.  Rest may also help.  Be sure to drink enough fluids.  Move to a regular diet as you feel able.   You may have a slight fever.  Call the doctor if your fever is over 100 F (37.7 C) (taken under the tongue) or lasts longer than 24 hours.  You may have a dry mouth, a sore throat, muscle aches or trouble sleeping. These should go away after 24 hours.  Do not make important or legal decisions.   It is recommended to avoid smoking.        Today you received an Exparel block to numb the nerves near your surgery site.  This is a block using local anesthetic or \"numbing\" medication injected around the nerves to anesthetize or \"numb\" the area supplied by those nerves.  This block is injected into the muscle layer near your surgical site.  This medication may numb the location where you had surgery up to 72 hours.  If your surgical site is an arm or leg you should be careful with your affected limb, since it is possible to injure your limb without being aware of it due to the numbing.  Until full feeling returns, you should guard against bumping or hitting your limb, and avoid extreme hot or cold temperatures on the skin.  As the block wears off, the feeling will return as a tingling or prickly sensation near your surgical site.  You will " experince more discomfort from your incision as the feeling returns.  You may want to take a pain pill (a narcotic or Tylenol if this was prescribed by your surgeon) when you start to experience mild pain before the pain beomes more severe.  If your pain medications do not control your pain, you should notify your surgeon.    Tips for taking pain medications  To get the best pain relief possible, remember these points:  Take pain medications as directed, before pain becomes severe.  Pain medication can upset your stomach: taking it with food may help.  Constipation is a common side effect of pain medication. Drink plenty of  fluids.  Eat foods high in fiber. Take a stool softener if recommended by your doctor or pharmacist.  Do not drink alcohol, drive or operate machinery while taking pain medications.  Ask about other ways to control pain, such as with heat, ice or relaxation.    Tylenol/Acetaminophen Consumption    If you feel your pain relief is insufficient, you may take Tylenol/Acetaminophen in addition to your narcotic pain medication.   Be careful not to exceed 4,000 mg of Tylenol/Acetaminophen in a 24 hour period from all sources.  If you are taking extra strength Tylenol/acetaminophen (500 mg), the maximum dose is 8 tablets in 24 hours.  If you are taking regular strength acetaminophen (325 mg), the maximum dose is 12 tablets in 24 hours.    Call a doctor for any of the following:  Signs of infection (fever, growing tenderness at the surgery site, a large amount of drainage or bleeding, severe pain, foul-smelling drainage, redness, swelling).  It has been over 8 to 10 hours since surgery and you are still not able to urinate (pass water).  Headache for over 24 hours.  Numbness, tingling or weakness the day after surgery (if you had spinal anesthesia).  Signs of Covid-19 infection (temperature over 100 degrees, shortness of breath, cough, loss of taste/smell, generalized body aches, persistent headache,  "chills, sore throat, nausea/vomiting/diarrhea)  Your doctor is:       Dr. Wicho Sears, Orthopaedics: [phone]               Or dial [phone] and ask for the resident on call for:  Orthopaedics  For emergency care, call the:  [facility] Emergency Department: [phone] (TTY for hearing impaired: [phone])  Tylenol 975 mg given at 1 pm.  Next available dose at 7 pm.        Safety Tips for Using Crutches    Crutch Fit:  Assume good standing posture with shoulders relaxed and crutch tips 6-8 inches out from the side of the foot.  The underarm pad should fall 2-3 fingers width below the armpit.  The handgrip is positioned level with the wrist to allow 30  flexion at the elbow.    Safety Tips:  Bear weight on your hands, not on your armpits.  Do not add extra padding to the underarm pad. This will, in effect, lengthen the crutches and increase risk of nerve injury.  Wear flat, properly fitting shoes. Do not walk in stocking feet, high heels or slippers.  Household hazards:  --Throw rugs should be removed from floors.  --Stairs should be cleared of obstacles.  --Use extra caution on slippery, highly polished, littered or uneven floor surfaces.  --Check for electric cords.  Check crutch tips for excessive wear and keep wing nuts tight.  While walking, look forward with  head up  and  eyes open.  Take equal length steps.  Use BOTH crutches.    Stairs Sequence:  UP: \"Good\" leg first, followed by  bad  leg, then crutches.  DOWN: Crutches, followed by  bad  leg, \"good\" leg.     Walking with Crutches:  Move both crutches forward at the same time.  Non-Weight Bearing (NWB):  Hold the involved leg up and swing through the crutches with the involved leg. The involved leg does not touch the floor.  Toe Touch Weight Bearing (TTWB): Move the involved leg forward. Rest it lightly on the floor for balance only. Step through the crutches with the uninvolved leg.  Partial Weight Bearing (PWB): Move the involved leg forward. " Step down the weight of the leg only.  Step through the crutches with the uninvolved leg.  Weight Bearing As Tolerated (WBAT): Move the involved leg forward. Put as much pressure through the involved leg as you can tolerate comfortably. Then step through the crutches with the uninvolved leg.

## 2024-01-15 NOTE — ANESTHESIA POSTPROCEDURE EVALUATION
Patient: Juan Baldwin    Procedure: Procedure(s):  Left hallux MTP joint cheilectomy       Anesthesia Type:  General    Note:  Disposition: Outpatient   Postop Pain Control: Uneventful            Sign Out: Well controlled pain   PONV: No   Neuro/Psych: Uneventful            Sign Out: Acceptable/Baseline neuro status   Airway/Respiratory: Uneventful            Sign Out: Acceptable/Baseline resp. status   CV/Hemodynamics: Uneventful            Sign Out: Acceptable CV status; No obvious hypovolemia; No obvious fluid overload   Other NRE: NONE   DID A NON-ROUTINE EVENT OCCUR?            Last vitals:  Vitals Value Taken Time   BP 94/50 01/15/24 1645   Temp 36.2  C (97.2  F) 01/15/24 1645   Pulse 51 01/15/24 1645   Resp 18 01/15/24 1645   SpO2 97 % 01/15/24 1645       Electronically Signed By: Mitul Will MD  January 15, 2024  5:30 PM

## 2024-01-29 ENCOUNTER — OFFICE VISIT (OUTPATIENT)
Dept: ORTHOPEDICS | Facility: CLINIC | Age: 39
End: 2024-01-29
Payer: COMMERCIAL

## 2024-01-29 ENCOUNTER — TELEPHONE (OUTPATIENT)
Dept: ORTHOPEDICS | Facility: CLINIC | Age: 39
End: 2024-01-29

## 2024-01-29 DIAGNOSIS — Z98.890 S/P FOOT SURGERY, LEFT: Primary | ICD-10-CM

## 2024-01-29 PROCEDURE — 99207 PR NO CHARGE NURSE ONLY: CPT

## 2024-01-29 NOTE — PROGRESS NOTES
Reason for visit:    Juan Baldwin came in to the clinic for a two week post op check.    His surgery was done 1/15/24 by Dr Sears.  He had left hallux cheilectomy of MTP joint.     Assessment:    Juan came into the clinic in post op shoe Non-WB    The Surgical wounds were exposed and found to be well-healed; so the 4 sutures were removed following application of chloraprep antiseptic.  3 steri strips were applied over incision.    Plan:     He was placed back in post op shoe.  He was told to begin Partial WB in post op shoe, transitioning into WBAT in post op shoe until his next follow up     He has an appointment to see Dr. Sears on 2/26/24 and at that time Dr. Sears will determine further restrictions.    All questions were answered. He has our phone number and will call with additional questions or problems.    Omid Coronado, MS, ATC, LAT, DHRUV  [facility]  Dr. Preet Pierre PA-C is the overseeing provider on site today.

## 2024-01-29 NOTE — TELEPHONE ENCOUNTER
[facility] Call Center    Phone Message    May a detailed message be left on voicemail: no     Reason for Call: Requesting surgery w/Dr Sears be canceled-he doesn't want to reschedule until the end of the year and will c/b then

## 2024-01-29 NOTE — TELEPHONE ENCOUNTER
Surgery with Dr. Sears was cancelled per pt; pt will call later to [phone] or [phone] if he wishes to proceed with rescheduling.     No further questions or concerns.

## 2024-01-29 NOTE — PROGRESS NOTES
[unfilled]    Suture removal    Date/Time: 1/29/2024 1:56 PM    Performed by: Omid Coronado ATC  Authorized by: Wicho Sears MD  Body area: lower extremity  Location details: left foot      UNIVERSAL PROTOCOL   Site Marked: NA  Prior Images Obtained and Reviewed:  NA  Required items: Required blood products, implants, devices and special equipment available    Patient identity confirmed:  Verbally with patient  NA - No sedation, light sedation, or local anesthesia  Confirmation Checklist:  Patient's identity using two indicators  Time out: Immediately prior to the procedure a time out was called    Universal Protocol: the Joint Commission Universal Protocol was followed    Preparation: Patient was prepped and draped in usual sterile fashion    ESBL (mL):  0    Wound Appearance: clean  Sutures Removed: 4  Post-removal: Steri-Strips applied      PROCEDURE    Patient Tolerance:  Patient tolerated the procedure well with no immediate complications  Length of time physician/provider present for 1:1 monitoring during sedation: 30

## 2024-02-21 DIAGNOSIS — Z98.890 S/P FOOT SURGERY, LEFT: Primary | ICD-10-CM

## 2024-02-26 ENCOUNTER — OFFICE VISIT (OUTPATIENT)
Dept: ORTHOPEDICS | Facility: CLINIC | Age: 39
End: 2024-02-26
Payer: COMMERCIAL

## 2024-02-26 ENCOUNTER — ANCILLARY PROCEDURE (OUTPATIENT)
Dept: GENERAL RADIOLOGY | Facility: CLINIC | Age: 39
End: 2024-02-26
Attending: ORTHOPAEDIC SURGERY
Payer: COMMERCIAL

## 2024-02-26 DIAGNOSIS — M20.21 HALLUX RIGIDUS OF RIGHT FOOT: Primary | ICD-10-CM

## 2024-02-26 DIAGNOSIS — Z98.890 S/P FOOT SURGERY, LEFT: ICD-10-CM

## 2024-02-26 PROCEDURE — 99024 POSTOP FOLLOW-UP VISIT: CPT | Performed by: ORTHOPAEDIC SURGERY

## 2024-02-26 PROCEDURE — 73630 X-RAY EXAM OF FOOT: CPT | Mod: LT | Performed by: RADIOLOGY

## 2024-02-26 NOTE — PROGRESS NOTES
Orthopaedic Surgery Clinic Follow-up Appointment    DOS:  01/15/2024, L hallux cheilectomy.    Very pleasant 39yo gentleman following up for the above  Denies fevers, chills, or incisional drainage  Reports he's doing well, denies pain  Exam  L foot incision well healed, dry, nontender  Sens slightly diminished at the medial and lateral aspects of the left hallux  Left EHL, FHL strength 5/5  Left hallux warm, well perfused  L hallux MTP ROM approx 0-30 with pulling on bottom and pinching on top being the perceived limit    Imaging:  Independent review of imaging was performed including weightbearing AP, oblique, and lateral radiographs of the left foot dated today, 02/26/2024.  Relevant findings were discussed with and shown to the patient. Postoperative changes of left hallux metatarsal head cheilectomy demonstrated with reduced dorsal/lateral prominence.  No obvious evidence for infection of fractures.    Impression  39yo male patient doing well after R hallux cheilectomy of hallux rigidus    Plan  May bear weight as tolerated and increase activities as tolerated  Can immerse surgical site under water in bathtub/chlorinated pool/ocean now, but no hot tubs/lakes/rivers until 3 mos postop  Gentle ROM exercises as tolerated based on pain level discussed/demonstrated  Signs and symptoms that should prompt medical attention were discussed  I would be very happy to see this very pleasant gentleman to discuss his condition and treatment options again at any time as needed, including potential future treatment for his contralateral right hallux  All questions answered

## 2024-05-25 ENCOUNTER — HEALTH MAINTENANCE LETTER (OUTPATIENT)
Age: 39
End: 2024-05-25

## 2024-05-30 ENCOUNTER — OFFICE VISIT (OUTPATIENT)
Dept: DERMATOLOGY | Facility: CLINIC | Age: 39
End: 2024-05-30
Payer: COMMERCIAL

## 2024-05-30 DIAGNOSIS — D18.01 CHERRY ANGIOMA: ICD-10-CM

## 2024-05-30 DIAGNOSIS — D22.9 MULTIPLE BENIGN NEVI: ICD-10-CM

## 2024-05-30 DIAGNOSIS — Z80.8 FAMILY HISTORY OF MELANOMA: Primary | ICD-10-CM

## 2024-05-30 DIAGNOSIS — L81.4 LENTIGO: ICD-10-CM

## 2024-05-30 DIAGNOSIS — L82.1 SEBORRHEIC KERATOSIS: ICD-10-CM

## 2024-05-30 PROCEDURE — 99203 OFFICE O/P NEW LOW 30 MIN: CPT | Performed by: PHYSICIAN ASSISTANT

## 2024-05-30 ASSESSMENT — PAIN SCALES - GENERAL: PAINLEVEL: NO PAIN (0)

## 2024-05-30 NOTE — NURSING NOTE
Chief Complaint   Patient presents with    Skin Check     No Concerns, Family History of melanoma- dad       There were no vitals filed for this visit.  Wt Readings from Last 1 Encounters:   01/15/24 86.2 kg (190 lb)       Seble Rizzo LPN .................5/30/2024

## 2024-05-30 NOTE — LETTER
5/30/2024         RE: Juan Baldwin  [address] [facility]  [place] [address]        Dear Colleague,    Thank you for referring your patient, Juan Baldwin, to the [facility]. Please see a copy of my visit note below.    Juan Baldwin is a pleasant 38 year old year old male patient here today for skin check he notes history of blistering sunburns on shoulders as a child. He reports that his father had melanoma. He doesn't always wear sunscreen. Patient has no other skin complaints today.  Remainder of the HPI, Meds, PMH, Allergies, FH, and SH was reviewed in chart.    History reviewed. No pertinent past medical history.    Past Surgical History:   Procedure Laterality Date     CHEILECTOMY Left 1/15/2024    Procedure: Left hallux MTP joint cheilectomy;  Surgeon: Wicho Sears MD;  Location: [facility] OR        Family History   Problem Relation Age of Onset     Melanoma Father        Social History     Socioeconomic History     Marital status:      Spouse name: Not on file     Number of children: Not on file     Years of education: Not on file     Highest education level: Not on file   Occupational History     Not on file   Tobacco Use     Smoking status: Never     Smokeless tobacco: Never   Substance and Sexual Activity     Alcohol use: Not on file     Drug use: Not on file     Sexual activity: Not on file   Other Topics Concern     Not on file   Social History Narrative     Not on file     Social Determinants of Health     Financial Resource Strain: Low Risk  (12/21/2023)    Financial Resource Strain      Within the past 12 months, have you or your family members you live with been unable to get utilities (heat, electricity) when it was really needed?: No   Food Insecurity: Low Risk  (12/21/2023)    Food Insecurity      Within the past 12 months, did you worry that your food would run out before you got money to buy more?: No      Within the past 12 months, did the food you bought just not  last and you didn t have money to get more?: No   Transportation Needs: Low Risk  (12/21/2023)    Transportation Needs      Within the past 12 months, has lack of transportation kept you from medical appointments, getting your medicines, non-medical meetings or appointments, work, or from getting things that you need?: No   Physical Activity: Not on file   Stress: Not on file   Social Connections: Unknown (4/25/2024)    Received from [facility]    Social Connections      Frequency of Communication with Friends and Family: Not on file   Interpersonal Safety: Low Risk  (12/21/2023)    Interpersonal Safety      Do you feel physically and emotionally safe where you currently live?: Yes      Within the past 12 months, have you been hit, slapped, kicked or otherwise physically hurt by someone?: No      Within the past 12 months, have you been humiliated or emotionally abused in other ways by your partner or ex-partner?: No   Housing Stability: Low Risk  (12/21/2023)    Housing Stability      Do you have housing? : Yes      Are you worried about losing your housing?: No       Outpatient Encounter Medications as of 5/30/2024   Medication Sig Dispense Refill     acetaminophen (TYLENOL) 500 MG tablet Take 2 tablets (1,000 mg) by mouth every 8 hours as needed for mild pain 60 tablet 0     ibuprofen (ADVIL/MOTRIN) 600 MG tablet Take 1 tablet (600 mg) by mouth every 6 hours as needed for moderate pain 40 tablet 0     No facility-administered encounter medications on file as of 5/30/2024.             O:   NAD, WDWN, Alert & Oriented, Mood & Affect wnl, Vitals stable   Here today alone   There were no vitals taken for this visit.   General appearance normal   Vitals stable   Alert, oriented and in no acute distress    Stuck on papules and brown macules on trunk and ext   Red papules on trunk  Brown papules and macules with regular pigment network and borders on torso and extremities      The  remainder of skin exam is normal       Eyes: Conjunctivae/lids:Normal     ENT: Lips normal    MSK:Normal    Cardiovascular: peripheral edema none    Pulm: Breathing Normal    Neuro/Psych: Orientation:Alert and Orientedx3 ; Mood/Affect:normal     A/P:  1. Seborrheic keratosis, lentigo, angioma, benign nevi, Family history of melanoma  It was a pleasure speaking to Juan Baldwin today.  BENIGN LESIONS DISCUSSED WITH PATIENT:  I discussed the specifics of tumor, prognosis, and genetics of benign lesions.  I explained that treatment of these lesions would be purely cosmetic and not medically neccessary.  I discussed with patient different removal options including excision, cautery and /or laser.      Nature and genetics of benign skin lesions dicussed with patient.  Signs and Symptoms of skin cancer discussed with patient.  ABCDEs of melanoma reviewed with patient.  Patient encouraged to perform monthly skin exams.  UV precautions reviewed with patient.  Risks of non-melanoma skin cancer discussed with patient   Return to clinic in one year or sooner if needed.       Again, thank you for allowing me to participate in the care of your patient.        Sincerely,        Lily Ott PA-C

## 2024-05-31 NOTE — PROGRESS NOTES
Juan Baldwin is a pleasant 38 year old year old male patient here today for skin check he notes history of blistering sunburns on shoulders as a child. He reports that his father had melanoma. He doesn't always wear sunscreen. Patient has no other skin complaints today.  Remainder of the HPI, Meds, PMH, Allergies, FH, and SH was reviewed in chart.    History reviewed. No pertinent past medical history.    Past Surgical History:   Procedure Laterality Date    CHEILECTOMY Left 1/15/2024    Procedure: Left hallux MTP joint cheilectomy;  Surgeon: Wicho Sears MD;  Location: [facility] [place]        Family History   Problem Relation Age of Onset    Melanoma Father        Social History     Socioeconomic History    Marital status:      Spouse name: Not on file    Number of children: Not on file    Years of education: Not on file    Highest education level: Not on file   Occupational History    Not on file   Tobacco Use    Smoking status: Never    Smokeless tobacco: Never   Substance and Sexual Activity    Alcohol use: Not on file    Drug use: Not on file    Sexual activity: Not on file   Other Topics Concern    Not on file   Social History Narrative    Not on file     Social Determinants of Health     Financial Resource Strain: Low Risk  (12/21/2023)    Financial Resource Strain     Within the past 12 months, have you or your family members you live with been unable to get utilities (heat, electricity) when it was really needed?: No   Food Insecurity: Low Risk  (12/21/2023)    Food Insecurity     Within the past 12 months, did you worry that your food would run out before you got money to buy more?: No     Within the past 12 months, did the food you bought just not last and you didn t have money to get more?: No   Transportation Needs: Low Risk  (12/21/2023)    Transportation Needs     Within the past 12 months, has lack of transportation kept you from medical appointments, getting your medicines, non-medical meetings or  appointments, work, or from getting things that you need?: No   Physical Activity: Not on file   Stress: Not on file   Social Connections: Unknown (4/25/2024)    Received from [facility]    Social Connections     Frequency of Communication with Friends and Family: Not on file   Interpersonal Safety: Low Risk  (12/21/2023)    Interpersonal Safety     Do you feel physically and emotionally safe where you currently live?: Yes     Within the past 12 months, have you been hit, slapped, kicked or otherwise physically hurt by someone?: No     Within the past 12 months, have you been humiliated or emotionally abused in other ways by your partner or ex-partner?: No   Housing Stability: Low Risk  (12/21/2023)    Housing Stability     Do you have housing? : Yes     Are you worried about losing your housing?: No       Outpatient Encounter Medications as of 5/30/2024   Medication Sig Dispense Refill    acetaminophen (TYLENOL) 500 MG tablet Take 2 tablets (1,000 mg) by mouth every 8 hours as needed for mild pain 60 tablet 0    ibuprofen (ADVIL/MOTRIN) 600 MG tablet Take 1 tablet (600 mg) by mouth every 6 hours as needed for moderate pain 40 tablet 0     No facility-administered encounter medications on file as of 5/30/2024.             O:   NAD, WDWN, Alert & Oriented, Mood & Affect wnl, Vitals stable   Here today alone   There were no vitals taken for this visit.   General appearance normal   Vitals stable   Alert, oriented and in no acute distress    Stuck on papules and brown macules on trunk and ext   Red papules on trunk  Brown papules and macules with regular pigment network and borders on torso and extremities      The remainder of skin exam is normal       Eyes: Conjunctivae/lids:Normal     ENT: Lips normal    MSK:Normal    Cardiovascular: peripheral edema none    Pulm: Breathing Normal    Neuro/Psych: Orientation:Alert and Orientedx3 ; Mood/Affect:normal     A/P:  1. Seborrheic  keratosis, lentigo, angioma, benign nevi, Family history of melanoma  It was a pleasure speaking to Juan Baldwin today.  BENIGN LESIONS DISCUSSED WITH PATIENT:  I discussed the specifics of tumor, prognosis, and genetics of benign lesions.  I explained that treatment of these lesions would be purely cosmetic and not medically neccessary.  I discussed with patient different removal options including excision, cautery and /or laser.      Nature and genetics of benign skin lesions dicussed with patient.  Signs and Symptoms of skin cancer discussed with patient.  ABCDEs of melanoma reviewed with patient.  Patient encouraged to perform monthly skin exams.  UV precautions reviewed with patient.  Risks of non-melanoma skin cancer discussed with patient   Return to clinic in one year or sooner if needed.

## 2024-08-13 ENCOUNTER — ANCILLARY PROCEDURE (OUTPATIENT)
Dept: GENERAL RADIOLOGY | Facility: CLINIC | Age: 39
End: 2024-08-13
Attending: NURSE PRACTITIONER
Payer: COMMERCIAL

## 2024-08-13 ENCOUNTER — OFFICE VISIT (OUTPATIENT)
Dept: FAMILY MEDICINE | Facility: CLINIC | Age: 39
End: 2024-08-13
Payer: COMMERCIAL

## 2024-08-13 VITALS
DIASTOLIC BLOOD PRESSURE: 74 MMHG | TEMPERATURE: 97.2 F | RESPIRATION RATE: 20 BRPM | HEART RATE: 55 BPM | WEIGHT: 190.2 LBS | OXYGEN SATURATION: 97 % | BODY MASS INDEX: 25.76 KG/M2 | SYSTOLIC BLOOD PRESSURE: 117 MMHG | HEIGHT: 72 IN

## 2024-08-13 DIAGNOSIS — S99.921A FOOT INJURY, RIGHT, INITIAL ENCOUNTER: Primary | ICD-10-CM

## 2024-08-13 DIAGNOSIS — S99.921A FOOT INJURY, RIGHT, INITIAL ENCOUNTER: ICD-10-CM

## 2024-08-13 PROCEDURE — 73660 X-RAY EXAM OF TOE(S): CPT | Mod: RT | Performed by: STUDENT IN AN ORGANIZED HEALTH CARE EDUCATION/TRAINING PROGRAM

## 2024-08-13 PROCEDURE — 99213 OFFICE O/P EST LOW 20 MIN: CPT | Performed by: NURSE PRACTITIONER

## 2024-08-13 ASSESSMENT — PAIN SCALES - GENERAL: PAINLEVEL: NO PAIN (0)

## 2024-08-13 NOTE — PATIENT INSTRUCTIONS
Rest the affected painful area as much as possible.  Apply ice for 15-20 minutes intermittently as needed and especially after any offending activity. Daily stretching.  As pain recedes, begin normal activities slowly as tolerated.  Consider Physical Therapy if symptoms not better with symptomatic care.    -Use splint either with tape or velcro splint  -Recommend wearing post op shoe until pain is relieved

## 2024-08-13 NOTE — PROGRESS NOTES
Assessment & Plan     Foot injury, right, initial encounter  Rest the affected painful area as much as possible.  Apply ice for 15-20 minutes intermittently as needed and especially after any offending activity. .  As pain recedes, begin normal activities slowly as tolerated.   -Recommend buddy taping toes together to provide extra support, wear surgical shoe while walking to reduce pressure and pain/discomfort.   -Okay to take acetaminophen or ibuprofen as needed for pain/discomfort.  -follow up with podiatry if symptoms worsen or do not start to improve over the next 2 weeks.    - XR Toe Right G/E 2 Views; Future          BMI  Estimated body mass index is 25.8 kg/m  as calculated from the following:    Height as of this encounter: 1.829 m (6').    Weight as of this encounter: 86.3 kg (190 lb 3.2 oz).             Mikki Martinez is a 38 year old, presenting for the following health issues:  Musculoskeletal Problem        8/13/2024     3:38 PM   Additional Questions   Roomed by Dipika GIVENS   Accompanied by self     Was playing softball, jammed his right big toe and it has been very sore and swollen ever since. History of hallux rigidus both toes, had surgery on left foot last year.     History of Present Illness       Reason for visit:  Foot injury  Symptom onset:  1-3 days ago  Symptoms include:  Swollen and limited mobility in foot  Symptom intensity:  Moderate  Symptom progression:  Staying the same  Had these symptoms before:  No  What makes it worse:  Bending big toe  What makes it better:  Ice and ibuprofen    He eats 2-3 servings of fruits and vegetables daily.He consumes 0 sweetened beverage(s) daily.He exercises with enough effort to increase his heart rate 30 to 60 minutes per day.  He exercises with enough effort to increase his heart rate 6 days per week.   He is taking medications regularly.         Objective    /74   Pulse 55   Temp 97.2  F (36.2  C) (Tympanic)   Resp 20   Ht 1.829 m (6')    Wt 86.3 kg (190 lb 3.2 oz)   SpO2 97%   BMI 25.80 kg/m    Body mass index is 25.8 kg/m .  Physical Exam  Constitutional:       Appearance: Normal appearance. He is not ill-appearing.   Pulmonary:      Effort: Pulmonary effort is normal.   Feet:      Right foot:      Skin integrity: Erythema present.   Neurological:      General: No focal deficit present.      Mental Status: He is alert and oriented to person, place, and time.   Psychiatric:         Mood and Affect: Mood normal.         Behavior: Behavior normal.         Thought Content: Thought content normal.         Judgment: Judgment normal.            XR Toe Right G/E 2 Views    Result Date: 8/13/2024  XR TOE RIGHT G/E 2 VIEWS 8/13/2024 4:09 PM HISTORY: jammed toe playing softball now unable to bend. history bilateral hallux rigidus; Foot injury, right, initial encounter COMPARISON: None     IMPRESSION: No fracture or malalignment. Soft tissue swelling throughout the distal first digit. Moderate first MTP osteoarthritis with prominent dorsal osteophyte which can be associated with hallux rigidus. Alfredito Solano MD   SYSTEM ID:  [id number]         Signed Electronically by: MIKE Wolfe CNP

## 2024-10-01 ENCOUNTER — OFFICE VISIT (OUTPATIENT)
Dept: FAMILY MEDICINE | Facility: CLINIC | Age: 39
End: 2024-10-01
Payer: COMMERCIAL

## 2024-10-01 VITALS
BODY MASS INDEX: 25.62 KG/M2 | WEIGHT: 183 LBS | HEART RATE: 58 BPM | DIASTOLIC BLOOD PRESSURE: 63 MMHG | HEIGHT: 71 IN | OXYGEN SATURATION: 98 % | TEMPERATURE: 97.7 F | RESPIRATION RATE: 20 BRPM | SYSTOLIC BLOOD PRESSURE: 115 MMHG

## 2024-10-01 DIAGNOSIS — Z00.00 ROUTINE GENERAL MEDICAL EXAMINATION AT A HEALTH CARE FACILITY: Primary | ICD-10-CM

## 2024-10-01 DIAGNOSIS — Z13.220 SCREENING FOR CHOLESTEROL LEVEL: ICD-10-CM

## 2024-10-01 DIAGNOSIS — Z23 NEED FOR INFLUENZA VACCINATION: ICD-10-CM

## 2024-10-01 LAB
CHOLEST SERPL-MCNC: 151 MG/DL
FASTING STATUS PATIENT QL REPORTED: NO
HDLC SERPL-MCNC: 43 MG/DL
LDLC SERPL CALC-MCNC: 98 MG/DL
NONHDLC SERPL-MCNC: 108 MG/DL
TRIGL SERPL-MCNC: 49 MG/DL

## 2024-10-01 PROCEDURE — 90471 IMMUNIZATION ADMIN: CPT

## 2024-10-01 PROCEDURE — 80061 LIPID PANEL: CPT

## 2024-10-01 PROCEDURE — 90656 IIV3 VACC NO PRSV 0.5 ML IM: CPT

## 2024-10-01 PROCEDURE — 36415 COLL VENOUS BLD VENIPUNCTURE: CPT

## 2024-10-01 PROCEDURE — 99395 PREV VISIT EST AGE 18-39: CPT | Mod: 25

## 2024-10-01 SDOH — HEALTH STABILITY: PHYSICAL HEALTH: ON AVERAGE, HOW MANY DAYS PER WEEK DO YOU ENGAGE IN MODERATE TO STRENUOUS EXERCISE (LIKE A BRISK WALK)?: 6 DAYS

## 2024-10-01 ASSESSMENT — SOCIAL DETERMINANTS OF HEALTH (SDOH): HOW OFTEN DO YOU GET TOGETHER WITH FRIENDS OR RELATIVES?: TWICE A WEEK

## 2024-10-01 ASSESSMENT — PAIN SCALES - GENERAL: PAINLEVEL: NO PAIN (0)

## 2024-10-01 NOTE — PATIENT INSTRUCTIONS
At [facility], we strive to deliver an exceptional experience to you, every time we see you. If you receive a survey, please let us know what we are doing well and/or what we could improve upon, as we do value your feedback.  If you have MyChart, you can expect to receive results automatically within 24 hours of their completion.  Your provider will send a note interpreting your results as well.   If you do not have MyChart, you should receive your results in about a week by mail.    Your care team:                            Family Medicine Internal Medicine   MD Philip Rocha, MD Mary Pereira, MD Ru Watkins, MD Gabriella Reinoso, PABraxtonC    Blade Jackson, MD Pediatrics   Janae Segal, MD Amie Prajapati, MD Khushi Peters, APRN CNP Tashia Holguin APRN CNP   Marilee Yip, MD Janice Aguiar, MD Mary Hernandez, CNP     Too Wei, CNP Same-Day Provider (No follow-up visits)   MIKE Wolff, DNP Lluvia Ramírez, PA-C   MIKE Bruno, FNP, BC ANTONIO PowellC     Clinic hours: Monday - Thursday 7 am-6 pm; Fridays 7 am-5 pm.   Urgent care: Monday - Friday 10 am- 8 pm; Saturday and Sunday 9 am-5 pm.    Clinic: [phone]       [facility]: Monday - Thursday 8 am - 7 pm; Friday 8 am - 6 pm  [facility]: [phone]     Patient Education   Preventive Care Advice   This is general advice given by our system to help you stay healthy. However, your care team may have specific advice just for you. Please talk to your care team about your preventive care needs.  Nutrition  Eat 5 or more servings of fruits and vegetables each day.  Try wheat bread, brown rice and whole grain pasta (instead of white bread, rice, and pasta).  Get enough calcium and vitamin D. Check the label on foods and aim for 100% of the RDA (recommended daily allowance).  Lifestyle  Exercise at least 150  minutes each week  (30 minutes a day, 5 days a week).  Do muscle strengthening activities 2 days a week. These help control your weight and prevent disease.  No smoking.  Wear sunscreen to prevent skin cancer.  Have a dental exam and cleaning every 6 months.  Yearly exams  See your health care team every year to talk about:  Any changes in your health.  Any medicines your care team has prescribed.  Preventive care, family planning, and ways to prevent chronic diseases.  Shots (vaccines)   HPV shots (up to age 26), if you've never had them before.  Hepatitis B shots (up to age 59), if you've never had them before.  COVID-19 shot: Get this shot when it's due.  Flu shot: Get a flu shot every year.  Tetanus shot: Get a tetanus shot every 10 years.  Pneumococcal, hepatitis A, and RSV shots: Ask your care team if you need these based on your risk.  Shingles shot (for age 50 and up)  General health tests  Diabetes screening:  Starting at age 35, Get screened for diabetes at least every 3 years.  If you are younger than age 35, ask your care team if you should be screened for diabetes.  Cholesterol test: At age 39, start having a cholesterol test every 5 years, or more often if advised.  Bone density scan (DEXA): At age 50, ask your care team if you should have this scan for osteoporosis (brittle bones).  Hepatitis C: Get tested at least once in your life.  STIs (sexually transmitted infections)  Before age 24: Ask your care team if you should be screened for STIs.  After age 24: Get screened for STIs if you're at risk. You are at risk for STIs (including HIV) if:  You are sexually active with more than one person.  You don't use condoms every time.  You or a partner was diagnosed with a sexually transmitted infection.  If you are at risk for HIV, ask about PrEP medicine to prevent HIV.  Get tested for HIV at least once in your life, whether you are at risk for HIV or not.  Cancer screening tests  Cervical cancer screening:  If you have a cervix, begin getting regular cervical cancer screening tests starting at age 21.  Breast cancer scan (mammogram): If you've ever had breasts, begin having regular mammograms starting at age 40. This is a scan to check for breast cancer.  Colon cancer screening: It is important to start screening for colon cancer at age 45.  Have a colonoscopy test every 10 years (or more often if you're at risk) Or, ask your provider about stool tests like a FIT test every year or Cologuard test every 3 years.  To learn more about your testing options, visit:   .  For help making a decision, visit:   https://bit.ly/xq69179.  Prostate cancer screening test: If you have a prostate, ask your care team if a prostate cancer screening test (PSA) at age 55 is right for you.  Lung cancer screening: If you are a current or former smoker ages 50 to 80, ask your care team if ongoing lung cancer screenings are right for you.  For informational purposes only. Not to replace the advice of your health care provider. Copyright   2023 [place]. All rights reserved. Clinically reviewed by the [facility] Transitions Program. [employer] [id number] - REV 01/24.

## 2024-10-01 NOTE — PROGRESS NOTES
"Preventive Care Visit  [facility]  MIKE Vazquez CNP, Nurse Practitioner Primary Care  Oct 1, 2024    Assessment & Plan     Routine general medical examination at a health care facility  - PRIMARY CARE FOLLOW-UP SCHEDULING    Screening for cholesterol level  - Lipid panel reflex to direct LDL Non-fasting    Need for influenza vaccination  - INFLUENZA VACCINE,SPLIT VIRUS,TRIVALENT,PF(FLUZONE)    BMI  Estimated body mass index is 25.34 kg/m  as calculated from the following:    Height as of this encounter: 1.81 m (5' 11.26\").    Weight as of this encounter: 83 kg (183 lb).   Weight management plan: Discussed healthy diet and exercise guidelines    Counseling  Appropriate preventive services were addressed with this patient via screening, questionnaire, or discussion as appropriate for fall prevention, nutrition, physical activity, Tobacco-use cessation, social engagement, weight loss and cognition.  Checklist reviewing preventive services available has been given to the patient.  Reviewed patient's diet, addressing concerns and/or questions.   Declines covid19 vaccine today.     RTC in 1 year for next WME, prn sooner. The patient verbalized understanding and agreement with the plan today and has no additional questions or concerns at this time.    Mikki Martinez is a 39 year old, presenting for the following:  Physical    Health Care Directive  Patient does not have a Health Care Directive or Living Will: Discussed advance care planning with patient; information given to patient to review.    HPI        10/1/2024   General Health   How would you rate your overall physical health? Good   Feel stress (tense, anxious, or unable to sleep) Not at all          10/1/2024   Nutrition   Three or more servings of calcium each day? Yes   Diet: Regular (no restrictions)   How many servings of fruit and vegetables per day? (!) 2-3   How many sweetened beverages each day? 0-1          " 10/1/2024   Exercise   Days per week of moderate/strenous exercise 6 days          10/1/2024   Social Factors   Frequency of gathering with friends or relatives Twice a week   Worry food won't last until get money to buy more No   Food not last or not have enough money for food? No   Do you have housing? (Housing is defined as stable permanent housing and does not include staying ouside in a car, in a tent, in an abandoned building, in an overnight shelter, or couch-surfing.) Yes   Are you worried about losing your housing? No   Lack of transportation? No   Unable to get utilities (heat,electricity)? No          10/1/2024   Dental   Dentist two times every year? Yes          10/1/2024   TB Screening   Were you born outside of the [place]? No      Today's PHQ-2 Score:       1/29/2024     9:10 AM   PHQ-2 ( 1999 [employer])   Q1: Little interest or pleasure in doing things 0   Q2: Feeling down, depressed or hopeless 0   PHQ-2 Score 0         10/1/2024   Substance Use   Alcohol more than 3/day or more than 7/wk No   Do you use any other substances recreationally? No      Social History     Tobacco Use    Smoking status: Never    Smokeless tobacco: Never   Vaping Use    Vaping status: Never Used   Substance Use Topics    Alcohol use: Yes     Comment: rarely, maybe 1 time per month, 1 drink per sitting    Drug use: Never         10/1/2024   One time HIV Screening   Previous HIV test? No          10/1/2024   STI Screening   New sexual partner(s) since last STI/HIV test? No          10/1/2024   Contraception/Family Planning   Questions about contraception or family planning No       Reviewed and updated as needed this visit by Provider   Tobacco  Allergies  Meds  Problems  Med Hx  Surg Hx  Fam Hx  Soc   Hx Sexual Activity        History reviewed. No pertinent past medical history.  Past Surgical History:   Procedure Laterality Date    CHEILECTOMY Left 1/15/2024    Procedure: Left hallux MTP joint cheilectomy;  Surgeon: Renu  "MD Wicho;  Location: [facility] [place]     Review of Systems  CONSTITUTIONAL: NEGATIVE for fever, chills, change in weight  INTEGUMENTARY/SKIN: NEGATIVE for worrisome rashes, moles or lesions  EYES: NEGATIVE for vision changes or irritation  ENT/MOUTH: NEGATIVE for ear, mouth and throat problems  RESP: NEGATIVE for significant cough or SOB  BREAST: NEGATIVE for masses, tenderness or discharge  CV: NEGATIVE for chest pain, palpitations or peripheral edema  GI: NEGATIVE for nausea, abdominal pain, heartburn, or change in bowel habits  : NEGATIVE for frequency, dysuria, or hematuria  MUSCULOSKELETAL: NEGATIVE for significant arthralgias or myalgia  NEURO: NEGATIVE for weakness, dizziness or paresthesias  ENDOCRINE: NEGATIVE for temperature intolerance, skin/hair changes  HEME: NEGATIVE for bleeding problems  PSYCHIATRIC: NEGATIVE for changes in mood or affect     Objective    Exam  /63 (BP Location: Left arm, Patient Position: Sitting, Cuff Size: Adult Regular)   Pulse 58   Temp 97.7  F (36.5  C) (Oral)   Resp 20   Ht 1.81 m (5' 11.26\")   Wt 83 kg (183 lb)   SpO2 98%   BMI 25.34 kg/m     Estimated body mass index is 25.34 kg/m  as calculated from the following:    Height as of this encounter: 1.81 m (5' 11.26\").    Weight as of this encounter: 83 kg (183 lb).    Physical Exam  GENERAL: alert and no distress  EYES: Eyes grossly normal to inspection, PERRL and conjunctivae and sclerae normal  HENT: ear canals and TM's normal, nose and mouth without ulcers or lesions  NECK: no adenopathy, no asymmetry, masses, or scars  RESP: lungs clear to auscultation - no rales, rhonchi or wheezes  CV: regular rate and rhythm, normal S1 S2, no S3 or S4, no murmur, click or rub, no peripheral edema  ABDOMEN: soft, nontender, no hepatosplenomegaly, no masses and bowel sounds normal  MS: no gross musculoskeletal defects noted, no edema  SKIN: no suspicious lesions or rashes  NEURO: Normal strength and tone, mentation intact and " speech normal  PSYCH: mentation appears normal, affect normal/bright  : pt declined    Signed Electronically by: MIKE Vazquez CNP

## 2024-10-07 NOTE — NURSING NOTE
"Reason For Visit:   Chief Complaint   Patient presents with    Consult     New patient consult for bilateral foot pain.  Referred by Dr. De Oliveira, seen by him on 10/4/23.  Pain started this past summer, no acute injury or incident.  Patient endorses overuse from golf/baseball and wearing cleats during sport.  Pain has not improved with rest.  No medication, injections, PT or orthotics.  Patient had XR done at [facility].  New WB XR today.       Ht 1.803 m (5' 11\")   Wt 89.4 kg (197 lb)   BMI 27.48 kg/m      Pain Assessment  Patient Currently in Pain: Yes  0-10 Pain Scale: 8 (8/10 while running/jogging.  1/10 while NWB)  Primary Pain Location: Foot (bilateral feet)    Omid Coronado, ATC    "
No

## 2025-09-01 ENCOUNTER — PATIENT OUTREACH (OUTPATIENT)
Dept: CARE COORDINATION | Facility: CLINIC | Age: 40
End: 2025-09-01
Payer: COMMERCIAL

## (undated) DEVICE — GOWN XLG DISP 9545

## (undated) DEVICE — BLADE SAW OSCIL/SAG STRK MICRO 9X31X0.38MM 2296-003-225

## (undated) DEVICE — DRSG KERLIX FLUFFS X5

## (undated) DEVICE — PREP CHLORAPREP 26ML TINTED ORANGE  260815

## (undated) DEVICE — SUCTION MANIFOLD NEPTUNE 2 SYS 1 PORT 702-025-000

## (undated) DEVICE — LINEN GOWN XLG 5407

## (undated) DEVICE — SOL NACL 0.9% IRRIG 500ML BOTTLE 2F7123

## (undated) DEVICE — SU VICRYL 2-0 CT-2 27" UND J269H

## (undated) DEVICE — GLOVE BIOGEL PI MICRO INDICATOR UNDERGLOVE SZ 8.0 48980

## (undated) DEVICE — LINEN ORTHO PACK 5446

## (undated) DEVICE — BONE WAX 2.5GM W31G

## (undated) DEVICE — TOURNIQUET CUFF 30" STERILE ZIM60-7070-105

## (undated) DEVICE — DRSG GAUZE 4X4" TRAY 6939

## (undated) DEVICE — BNDG KLING 2" 2231

## (undated) DEVICE — ESU GROUND PAD ADULT W/CORD E7507

## (undated) DEVICE — SU ETHILON 3-0 PS-1 18" 1663H

## (undated) DEVICE — PACK LOWER EXTREMITY CUSTOM ASC

## (undated) DEVICE — BLADE KNIFE SURG 15 371115

## (undated) DEVICE — GLOVE BIOGEL PI SZ 8.0 40880

## (undated) RX ORDER — FENTANYL CITRATE 50 UG/ML
INJECTION, SOLUTION INTRAMUSCULAR; INTRAVENOUS
Status: DISPENSED
Start: 2024-01-15

## (undated) RX ORDER — CEFAZOLIN SODIUM 2 G/50ML
SOLUTION INTRAVENOUS
Status: DISPENSED
Start: 2024-01-15

## (undated) RX ORDER — EPHEDRINE SULFATE 50 MG/ML
INJECTION, SOLUTION INTRAMUSCULAR; INTRAVENOUS; SUBCUTANEOUS
Status: DISPENSED
Start: 2024-01-15

## (undated) RX ORDER — PROPOFOL 10 MG/ML
INJECTION, EMULSION INTRAVENOUS
Status: DISPENSED
Start: 2024-01-15

## (undated) RX ORDER — ACETAMINOPHEN 325 MG/1
TABLET ORAL
Status: DISPENSED
Start: 2024-01-15

## (undated) RX ORDER — GABAPENTIN 300 MG/1
CAPSULE ORAL
Status: DISPENSED
Start: 2024-01-15

## (undated) RX ORDER — BUPIVACAINE HYDROCHLORIDE 5 MG/ML
INJECTION, SOLUTION EPIDURAL; INTRACAUDAL
Status: DISPENSED
Start: 2024-01-15